# Patient Record
Sex: MALE | Race: WHITE | Employment: FULL TIME | URBAN - NONMETROPOLITAN AREA
[De-identification: names, ages, dates, MRNs, and addresses within clinical notes are randomized per-mention and may not be internally consistent; named-entity substitution may affect disease eponyms.]

---

## 2017-05-26 ENCOUNTER — APPOINTMENT (OUTPATIENT)
Dept: GENERAL RADIOLOGY | Age: 52
End: 2017-05-26
Payer: COMMERCIAL

## 2017-05-26 ENCOUNTER — HOSPITAL ENCOUNTER (EMERGENCY)
Age: 52
Discharge: HOME | End: 2017-05-26
Payer: COMMERCIAL

## 2017-05-26 VITALS
BODY MASS INDEX: 28.04 KG/M2 | RESPIRATION RATE: 18 BRPM | OXYGEN SATURATION: 95 % | HEART RATE: 94 BPM | HEIGHT: 68 IN | SYSTOLIC BLOOD PRESSURE: 130 MMHG | TEMPERATURE: 97.9 F | DIASTOLIC BLOOD PRESSURE: 81 MMHG | WEIGHT: 185 LBS

## 2017-05-26 DIAGNOSIS — S62.639B OPEN FRACTURE OF TUFT OF DISTAL PHALANX OF FINGER, INITIAL ENCOUNTER: Primary | ICD-10-CM

## 2017-05-26 DIAGNOSIS — S61.309A NAIL AVULSION, FINGER, INITIAL ENCOUNTER: ICD-10-CM

## 2017-05-26 PROCEDURE — 90715 TDAP VACCINE 7 YRS/> IM: CPT | Performed by: NURSE PRACTITIONER

## 2017-05-26 PROCEDURE — 99283 EMERGENCY DEPT VISIT LOW MDM: CPT | Performed by: NURSE PRACTITIONER

## 2017-05-26 PROCEDURE — 99283 EMERGENCY DEPT VISIT LOW MDM: CPT

## 2017-05-26 PROCEDURE — 6360000002 HC RX W HCPCS: Performed by: NURSE PRACTITIONER

## 2017-05-26 PROCEDURE — 90471 IMMUNIZATION ADMIN: CPT | Performed by: NURSE PRACTITIONER

## 2017-05-26 PROCEDURE — 12001 RPR S/N/AX/GEN/TRNK 2.5CM/<: CPT | Performed by: NURSE PRACTITIONER

## 2017-05-26 PROCEDURE — 12001 RPR S/N/AX/GEN/TRNK 2.5CM/<: CPT

## 2017-05-26 PROCEDURE — 73140 X-RAY EXAM OF FINGER(S): CPT

## 2017-05-26 PROCEDURE — 2500000003 HC RX 250 WO HCPCS: Performed by: NURSE PRACTITIONER

## 2017-05-26 RX ORDER — LIDOCAINE HYDROCHLORIDE 10 MG/ML
5 INJECTION, SOLUTION INFILTRATION; PERINEURAL ONCE
Status: COMPLETED | OUTPATIENT
Start: 2017-05-26 | End: 2017-05-26

## 2017-05-26 RX ORDER — CEPHALEXIN 500 MG/1
500 CAPSULE ORAL 4 TIMES DAILY
Qty: 28 CAPSULE | Refills: 0 | Status: SHIPPED | OUTPATIENT
Start: 2017-05-26 | End: 2017-06-02

## 2017-05-26 RX ORDER — HYDROCODONE BITARTRATE AND ACETAMINOPHEN 5; 325 MG/1; MG/1
1 TABLET ORAL EVERY 6 HOURS PRN
Qty: 15 TABLET | Refills: 0 | Status: SHIPPED | OUTPATIENT
Start: 2017-05-26 | End: 2017-06-02

## 2017-05-26 RX ADMIN — TETANUS TOXOID, REDUCED DIPHTHERIA TOXOID AND ACELLULAR PERTUSSIS VACCINE, ADSORBED 0.5 ML: 5; 2.5; 8; 8; 2.5 SUSPENSION INTRAMUSCULAR at 18:59

## 2017-05-26 RX ADMIN — LIDOCAINE HYDROCHLORIDE 5 ML: 10 INJECTION, SOLUTION INFILTRATION; PERINEURAL at 19:00

## 2017-05-26 ASSESSMENT — ENCOUNTER SYMPTOMS
EYES NEGATIVE: 1
GASTROINTESTINAL NEGATIVE: 1
RESPIRATORY NEGATIVE: 1

## 2017-05-26 ASSESSMENT — PAIN SCALES - GENERAL
PAINLEVEL_OUTOF10: 5

## 2017-09-08 ENCOUNTER — OFFICE VISIT (OUTPATIENT)
Dept: CARDIOTHORACIC SURGERY | Age: 52
End: 2017-09-08
Payer: COMMERCIAL

## 2017-09-08 VITALS
OXYGEN SATURATION: 97 % | HEART RATE: 71 BPM | HEIGHT: 70 IN | DIASTOLIC BLOOD PRESSURE: 87 MMHG | WEIGHT: 185 LBS | SYSTOLIC BLOOD PRESSURE: 124 MMHG | BODY MASS INDEX: 26.48 KG/M2

## 2017-09-08 DIAGNOSIS — R91.1 PULMONARY NODULE: Primary | ICD-10-CM

## 2017-09-08 PROCEDURE — 99204 OFFICE O/P NEW MOD 45 MIN: CPT | Performed by: THORACIC SURGERY (CARDIOTHORACIC VASCULAR SURGERY)

## 2017-10-24 ENCOUNTER — TELEPHONE (OUTPATIENT)
Dept: CARDIOTHORACIC SURGERY | Age: 52
End: 2017-10-24

## 2017-10-24 NOTE — TELEPHONE ENCOUNTER
Called Mr. Krista Hitchcock and left msg. That Dr Azra Josue was incistant that he stop smoking 3 weeks for proceeding with his PFT to determine if he was a surgical candidate. Told patient to call back if he has any questions.

## 2017-11-10 ENCOUNTER — TELEPHONE (OUTPATIENT)
Dept: CARDIOTHORACIC SURGERY | Age: 52
End: 2017-11-10

## 2017-11-10 DIAGNOSIS — Z85.038 PERSONAL HISTORY OF COLON CANCER: ICD-10-CM

## 2017-11-10 DIAGNOSIS — R91.1 PULMONARY NODULE: Primary | ICD-10-CM

## 2017-11-10 DIAGNOSIS — Z87.891 FORMER CIGARETTE SMOKER: ICD-10-CM

## 2017-11-10 NOTE — TELEPHONE ENCOUNTER
Patient came by office today and stated he had quit smoking for 11/2 wks. I called him back to let him know we have scheduled PFT/ABG for Friday, Nov 17. [place] at 10:30am. He cannot use any inhalers 4 hrs prior. His return appt to see us is scheduled for Tues, Nov 21 at 12:30pm. He voiced understanding.

## 2017-11-17 ENCOUNTER — HOSPITAL ENCOUNTER (OUTPATIENT)
Dept: PULMONOLOGY | Age: 52
Discharge: HOME | End: 2017-11-17
Payer: COMMERCIAL

## 2017-11-17 DIAGNOSIS — R91.1 PULMONARY NODULE: ICD-10-CM

## 2017-11-17 DIAGNOSIS — Z87.891 FORMER CIGARETTE SMOKER: ICD-10-CM

## 2017-11-17 LAB
BASE EXCESS ARTERIAL: 1.3 MMOL/L (ref -2–2)
CARBOXYHEMOGLOBIN ARTERIAL: 0.9 % (ref 0–5)
HCO3 ARTERIAL: 25.9 MMOL/L (ref 22–26)
HEMOGLOBIN, ART, EXTENDED: 15.8 G/DL (ref 14–18)
METHEMOGLOBIN ARTERIAL: 0.4 %
O2 CONTENT ARTERIAL: 20.9 ML/DL
O2 SAT, ARTERIAL: 94.1 %
O2 THERAPY: ABNORMAL
PCO2 ARTERIAL: 40 MMHG (ref 35–45)
PH ARTERIAL: 7.42 (ref 7.35–7.45)
PO2 ARTERIAL: 72 MMHG (ref 80–100)
POTASSIUM, WHOLE BLOOD: 4.1

## 2017-11-17 PROCEDURE — 94729 DIFFUSING CAPACITY: CPT

## 2017-11-17 PROCEDURE — 84132 ASSAY OF SERUM POTASSIUM: CPT

## 2017-11-17 PROCEDURE — 6360000002 HC RX W HCPCS: Performed by: THORACIC SURGERY (CARDIOTHORACIC VASCULAR SURGERY)

## 2017-11-17 PROCEDURE — 94060 EVALUATION OF WHEEZING: CPT

## 2017-11-17 PROCEDURE — 36600 WITHDRAWAL OF ARTERIAL BLOOD: CPT

## 2017-11-17 PROCEDURE — 94727 GAS DIL/WSHOT DETER LNG VOL: CPT

## 2017-11-17 PROCEDURE — 82803 BLOOD GASES ANY COMBINATION: CPT

## 2017-11-17 RX ORDER — ALBUTEROL SULFATE 2.5 MG/3ML
2.5 SOLUTION RESPIRATORY (INHALATION) EVERY 6 HOURS PRN
Status: DISCONTINUED | OUTPATIENT
Start: 2017-11-17 | End: 2017-11-19 | Stop reason: HOSPADM

## 2017-11-21 ENCOUNTER — OFFICE VISIT (OUTPATIENT)
Dept: CARDIOTHORACIC SURGERY | Age: 52
End: 2017-11-21
Payer: COMMERCIAL

## 2017-11-21 VITALS
DIASTOLIC BLOOD PRESSURE: 88 MMHG | HEIGHT: 70 IN | WEIGHT: 189 LBS | BODY MASS INDEX: 27.06 KG/M2 | OXYGEN SATURATION: 96 % | SYSTOLIC BLOOD PRESSURE: 132 MMHG | HEART RATE: 84 BPM

## 2017-11-21 DIAGNOSIS — R91.1 PULMONARY NODULE: Primary | ICD-10-CM

## 2017-11-21 PROCEDURE — 99213 OFFICE O/P EST LOW 20 MIN: CPT | Performed by: THORACIC SURGERY (CARDIOTHORACIC VASCULAR SURGERY)

## 2017-11-21 RX ORDER — SODIUM PICOSULFATE, MAGNESIUM OXIDE, AND ANHYDROUS CITRIC ACID 10; 3.5; 12 MG/16.1G; G/16.1G; G/16.1G
POWDER, METERED ORAL
COMMUNITY
Start: 2017-11-03 | End: 2017-12-05 | Stop reason: ALTCHOICE

## 2017-11-21 RX ORDER — PREDNISONE 10 MG/1
TABLET ORAL
Refills: 0 | COMMUNITY
Start: 2017-10-03 | End: 2017-12-05 | Stop reason: ALTCHOICE

## 2017-11-21 NOTE — PROGRESS NOTES
Subjective:      Patient ID: Jessa Wilkerson is a 46 y.o. male.     HPI    Review of Systems    Objective:   Physical Exam    Assessment:         Plan:

## 2017-11-28 ENCOUNTER — TELEPHONE (OUTPATIENT)
Dept: CARDIOTHORACIC SURGERY | Age: 52
End: 2017-11-28

## 2017-12-04 ENCOUNTER — PREP FOR PROCEDURE (OUTPATIENT)
Dept: CARDIOTHORACIC SURGERY | Age: 52
End: 2017-12-04

## 2017-12-04 DIAGNOSIS — R91.1 PULMONARY NODULE: Primary | ICD-10-CM

## 2017-12-04 RX ORDER — CHLORHEXIDINE GLUCONATE 4 G/100ML
SOLUTION TOPICAL ONCE
Status: CANCELLED | OUTPATIENT
Start: 2017-12-05

## 2017-12-04 NOTE — H&P
predniSONE (DELTASONE) 10 MG tablet TAKE 1 TABLET BY MOUTH TWICE A DAY FOR 5 DAYS   0    Rosuvastatin Calcium (CRESTOR PO) Take by mouth Indications: but hasnt been taking          No current facility-administered medications for this visit.       No Known Allergies  History reviewed. No pertinent family history. Social History            Social History    Marital status:        Spouse name: N/A    Number of children: N/A    Years of education: N/A          Occupational History    Not on file.             Social History Main Topics    Smoking status: Former Smoker       Packs/day: 2.00       Years: 25.00       Types: Cigarettes    Smokeless tobacco: Never Used    Alcohol use Yes         Comment: on weekends    Drug use: No    Sexual activity: Not on file           Other Topics Concern    Not on file          Social History Narrative    No narrative on file            ROS:   HEENT denies neck pain, sore throat, blurred vision, diplopia.    Respiratory chronic shortness of breath,  Denies any hemoptysis or wheezes.    Cardiovascular denies angina, palpitations, lower extremity edema.    GI denies abdominal pain, nausea, vomiting, constipation.  denies urinary frequency, or dysuria.    Musculoskeletal No joint pain or swelling. Neurologic denies diplopia, headache, or ataxia.    The other 14 systems have been reviewed and are negative     Physical Exam /88   Pulse 84   Ht 5' 10\" (1.778 m)   Wt 189 lb (85.7 kg)   SpO2 96%   BMI 27.12 kg/m² appears fairly robust in no acute distress  HEENT: Neck is soft. No cervical masses. Throat is clear. There are no oral lesions seen. Tongue protrudes to midline. Respiratory: clear lung sounds, normal, no wheezes or rhonchi. Cardiovascular: No carotid bruits. Cardiac sounds are clear. Regular rate and rhythm, no murmurs or gallops.   Peripheral pulses are normal and the femoral carotid and radial areas  Abdomen: Abdomen is soft, no masses or tenderness. Normal bowel sounds. There were and spleen are not enlarged  Extremities: No swelling or edema. No clubbing or cyanosis. No rashes or lesions are seen  Lymphatic: No lymphadenopathy in the cervical, axillary or periperals. Neurologic exam: No lateralizing neurologic findings, cranial nerves II-XII are normal.  Psychiatric: No anxiety or depression. This 24-year-old male has a history of advanced stage adenocarcinoma of the colon. Only a year out from his  original surgery with a lesion in the right lung that may be a metastatic lesion versus a primary lung cancer. I agree that surgical excision is indicated. I therefore explained the procedure right thoracotomy with wedge resection of the tumor with possible lobectomy. I discussed risks, benefits, alternatives to the operation. The risks include bleeding, stroke, infection, reoperation, blood transfusion, multisystem organ failure, respiratory failure and sudden cardiac death. Scheduled date of surgery is 12/6/17. I spent in excess of 60 minutes reviewing this patient's chest x-rays, CT scans .   Physical examination and face-to-face discussion       Significant History/Details    Smoking: Former Smoker (Quit Date: ), 2 ppd, 50 pack-years   Smokeless Tobacco: Never Used   Alcohol: Yes   No open orders     Specialty Comments EditShow All   No comments regarding your specialty     Family Comments Edit   None     Care Team and Communications    PCPs Type   Eleni Garcia MD General   No other patient care team members      Recipients of Past [address] Encounter - 5/26/2017     Eleni Garcia MD 5/26/2017 Fax         Gerre Counter as Reviewed Allergies (Review Complete on 11/21/2017)   Problem List (Reviewed by You on 11/21/2017)        Allergies        No Known Allergies     Dirk as: Review Complete Review Complete on 11/21/2017     Problem List   Collapse by Default   Collapse by Default           New problems from outside sources

## 2017-12-05 ENCOUNTER — HOSPITAL ENCOUNTER (OUTPATIENT)
Dept: GENERAL RADIOLOGY | Age: 52
Discharge: HOME | DRG: 165 | End: 2017-12-05
Payer: COMMERCIAL

## 2017-12-05 ENCOUNTER — HOSPITAL ENCOUNTER (OUTPATIENT)
Dept: PREADMISSION TESTING | Age: 52
Discharge: HOME | DRG: 165 | End: 2017-12-05
Payer: COMMERCIAL

## 2017-12-05 VITALS — WEIGHT: 189 LBS | HEIGHT: 70 IN | BODY MASS INDEX: 27.06 KG/M2

## 2017-12-05 DIAGNOSIS — R91.1 PULMONARY NODULE: ICD-10-CM

## 2017-12-05 LAB
ABO/RH: NORMAL
ALBUMIN SERPL-MCNC: 4.1 G/DL (ref 3.5–5.2)
ALP BLD-CCNC: 75 U/L (ref 40–130)
ALT SERPL-CCNC: 17 U/L (ref 5–41)
ANION GAP SERPL CALCULATED.3IONS-SCNC: 12 MMOL/L (ref 7–19)
ANTIBODY SCREEN: NORMAL
APTT: 29.4 SEC (ref 26–36.2)
AST SERPL-CCNC: 13 U/L (ref 5–40)
BASOPHILS ABSOLUTE: 0.1 K/UL (ref 0–0.2)
BASOPHILS RELATIVE PERCENT: 1.1 % (ref 0–1)
BILIRUB SERPL-MCNC: 0.3 MG/DL (ref 0.2–1.2)
BILIRUBIN URINE: NEGATIVE
BLOOD, URINE: NEGATIVE
BUN BLDV-MCNC: 10 MG/DL (ref 6–20)
CALCIUM SERPL-MCNC: 9.1 MG/DL (ref 8.6–10)
CHLORIDE BLD-SCNC: 102 MMOL/L (ref 98–111)
CLARITY: CLEAR
CO2: 26 MMOL/L (ref 22–29)
COLOR: YELLOW
CREAT SERPL-MCNC: 0.8 MG/DL (ref 0.5–1.2)
EOSINOPHILS ABSOLUTE: 0.1 K/UL (ref 0–0.6)
EOSINOPHILS RELATIVE PERCENT: 1.8 % (ref 0–5)
GFR NON-AFRICAN AMERICAN: >60
GLUCOSE BLD-MCNC: 95 MG/DL (ref 74–109)
GLUCOSE URINE: NEGATIVE MG/DL
HCT VFR BLD CALC: 45.5 % (ref 42–52)
HEMOGLOBIN: 15 G/DL (ref 14–18)
INR BLD: 0.92 (ref 0.88–1.18)
KETONES, URINE: NEGATIVE MG/DL
LEUKOCYTE ESTERASE, URINE: NEGATIVE
LYMPHOCYTES ABSOLUTE: 2.1 K/UL (ref 1.1–4.5)
LYMPHOCYTES RELATIVE PERCENT: 26.2 % (ref 20–40)
MCH RBC QN AUTO: 29.2 PG (ref 27–31)
MCHC RBC AUTO-ENTMCNC: 33 G/DL (ref 33–37)
MCV RBC AUTO: 88.7 FL (ref 80–94)
MONOCYTES ABSOLUTE: 0.5 K/UL (ref 0–0.9)
MONOCYTES RELATIVE PERCENT: 6.3 % (ref 0–10)
NEUTROPHILS ABSOLUTE: 5.1 K/UL (ref 1.5–7.5)
NEUTROPHILS RELATIVE PERCENT: 64 % (ref 50–65)
NITRITE, URINE: NEGATIVE
PDW BLD-RTO: 13.5 % (ref 11.5–14.5)
PH UA: 7.5
PLATELET # BLD: 381 K/UL (ref 130–400)
PMV BLD AUTO: 9.4 FL (ref 9.4–12.4)
POTASSIUM SERPL-SCNC: 4.1 MMOL/L (ref 3.5–5)
PROTEIN UA: NEGATIVE MG/DL
PROTHROMBIN TIME: 12.3 SEC (ref 12–14.6)
RBC # BLD: 5.13 M/UL (ref 4.7–6.1)
SODIUM BLD-SCNC: 140 MMOL/L (ref 136–145)
SPECIFIC GRAVITY UA: 1.01
TOTAL PROTEIN: 6.6 G/DL (ref 6.6–8.7)
UROBILINOGEN, URINE: 0.2 E.U./DL
WBC # BLD: 7.9 K/UL (ref 4.8–10.8)

## 2017-12-05 PROCEDURE — 86900 BLOOD TYPING SEROLOGIC ABO: CPT

## 2017-12-05 PROCEDURE — 93005 ELECTROCARDIOGRAM TRACING: CPT

## 2017-12-05 PROCEDURE — 71020 XR CHEST STANDARD TWO VW: CPT

## 2017-12-05 PROCEDURE — 86850 RBC ANTIBODY SCREEN: CPT

## 2017-12-05 PROCEDURE — 80053 COMPREHEN METABOLIC PANEL: CPT

## 2017-12-05 PROCEDURE — 87070 CULTURE OTHR SPECIMN AEROBIC: CPT

## 2017-12-05 PROCEDURE — 85610 PROTHROMBIN TIME: CPT

## 2017-12-05 PROCEDURE — 85730 THROMBOPLASTIN TIME PARTIAL: CPT

## 2017-12-05 PROCEDURE — 85025 COMPLETE CBC W/AUTO DIFF WBC: CPT

## 2017-12-05 PROCEDURE — 81003 URINALYSIS AUTO W/O SCOPE: CPT

## 2017-12-05 PROCEDURE — 86901 BLOOD TYPING SEROLOGIC RH(D): CPT

## 2017-12-05 RX ORDER — ACETAMINOPHEN 325 MG/1
650 TABLET ORAL
COMMUNITY

## 2017-12-06 ENCOUNTER — ANESTHESIA (OUTPATIENT)
Dept: OPERATING ROOM | Age: 52
DRG: 165 | End: 2017-12-06
Payer: COMMERCIAL

## 2017-12-06 ENCOUNTER — HOSPITAL ENCOUNTER (INPATIENT)
Age: 52
LOS: 4 days | Discharge: HOME | DRG: 165 | End: 2017-12-10
Attending: THORACIC SURGERY (CARDIOTHORACIC VASCULAR SURGERY) | Admitting: THORACIC SURGERY (CARDIOTHORACIC VASCULAR SURGERY)
Payer: COMMERCIAL

## 2017-12-06 ENCOUNTER — APPOINTMENT (OUTPATIENT)
Dept: GENERAL RADIOLOGY | Age: 52
DRG: 165 | End: 2017-12-06
Attending: THORACIC SURGERY (CARDIOTHORACIC VASCULAR SURGERY)
Payer: COMMERCIAL

## 2017-12-06 ENCOUNTER — ANESTHESIA EVENT (OUTPATIENT)
Dept: OPERATING ROOM | Age: 52
DRG: 165 | End: 2017-12-06
Payer: COMMERCIAL

## 2017-12-06 VITALS
RESPIRATION RATE: 7 BRPM | TEMPERATURE: 96.3 F | OXYGEN SATURATION: 96 % | SYSTOLIC BLOOD PRESSURE: 112 MMHG | DIASTOLIC BLOOD PRESSURE: 33 MMHG

## 2017-12-06 PROBLEM — C78.00 COLON CANCER METASTASIZED TO LUNG (HCC): Status: ACTIVE | Noted: 2017-12-06

## 2017-12-06 PROBLEM — C18.9 COLON CANCER METASTASIZED TO LUNG (HCC): Status: ACTIVE | Noted: 2017-12-06

## 2017-12-06 LAB
EKG P AXIS: 41 DEGREES
EKG P-R INTERVAL: 144 MS
EKG Q-T INTERVAL: 428 MS
EKG QRS DURATION: 112 MS
EKG QTC CALCULATION (BAZETT): 429 MS
EKG T AXIS: -15 DEGREES
MRSA CULTURE ONLY: NORMAL

## 2017-12-06 PROCEDURE — 2580000003 HC RX 258: Performed by: ANESTHESIOLOGY

## 2017-12-06 PROCEDURE — 2700000000 HC OXYGEN THERAPY PER DAY

## 2017-12-06 PROCEDURE — 3600000014 HC SURGERY LEVEL 4 ADDTL 15MIN: Performed by: THORACIC SURGERY (CARDIOTHORACIC VASCULAR SURGERY)

## 2017-12-06 PROCEDURE — 3700000001 HC ADD 15 MINUTES (ANESTHESIA): Performed by: THORACIC SURGERY (CARDIOTHORACIC VASCULAR SURGERY)

## 2017-12-06 PROCEDURE — 2580000003 HC RX 258: Performed by: THORACIC SURGERY (CARDIOTHORACIC VASCULAR SURGERY)

## 2017-12-06 PROCEDURE — 2500000003 HC RX 250 WO HCPCS: Performed by: NURSE ANESTHETIST, CERTIFIED REGISTERED

## 2017-12-06 PROCEDURE — 6360000002 HC RX W HCPCS: Performed by: NURSE ANESTHETIST, CERTIFIED REGISTERED

## 2017-12-06 PROCEDURE — 2720000003 HC MISC SUTURE/STAPLES/RELOADS/ETC: Performed by: THORACIC SURGERY (CARDIOTHORACIC VASCULAR SURGERY)

## 2017-12-06 PROCEDURE — 6360000002 HC RX W HCPCS: Performed by: THORACIC SURGERY (CARDIOTHORACIC VASCULAR SURGERY)

## 2017-12-06 PROCEDURE — 2580000003 HC RX 258: Performed by: NURSE ANESTHETIST, CERTIFIED REGISTERED

## 2017-12-06 PROCEDURE — 6370000000 HC RX 637 (ALT 250 FOR IP): Performed by: THORACIC SURGERY (CARDIOTHORACIC VASCULAR SURGERY)

## 2017-12-06 PROCEDURE — 3600000004 HC SURGERY LEVEL 4 BASE: Performed by: THORACIC SURGERY (CARDIOTHORACIC VASCULAR SURGERY)

## 2017-12-06 PROCEDURE — 2780000010 HC IMPLANT OTHER: Performed by: THORACIC SURGERY (CARDIOTHORACIC VASCULAR SURGERY)

## 2017-12-06 PROCEDURE — 6360000002 HC RX W HCPCS: Performed by: ANESTHESIOLOGY

## 2017-12-06 PROCEDURE — 94640 AIRWAY INHALATION TREATMENT: CPT

## 2017-12-06 PROCEDURE — 7100000001 HC PACU RECOVERY - ADDTL 15 MIN: Performed by: THORACIC SURGERY (CARDIOTHORACIC VASCULAR SURGERY)

## 2017-12-06 PROCEDURE — 7100000000 HC PACU RECOVERY - FIRST 15 MIN: Performed by: THORACIC SURGERY (CARDIOTHORACIC VASCULAR SURGERY)

## 2017-12-06 PROCEDURE — 0BTF0ZZ RESECTION OF RIGHT LOWER LUNG LOBE, OPEN APPROACH: ICD-10-PCS | Performed by: THORACIC SURGERY (CARDIOTHORACIC VASCULAR SURGERY)

## 2017-12-06 PROCEDURE — 71010 XR CHEST PORTABLE: CPT

## 2017-12-06 PROCEDURE — C1729 CATH, DRAINAGE: HCPCS | Performed by: THORACIC SURGERY (CARDIOTHORACIC VASCULAR SURGERY)

## 2017-12-06 PROCEDURE — 2720000001 HC MISC SURG SUPPLY STERILE $51-500: Performed by: THORACIC SURGERY (CARDIOTHORACIC VASCULAR SURGERY)

## 2017-12-06 PROCEDURE — 3700000000 HC ANESTHESIA ATTENDED CARE: Performed by: THORACIC SURGERY (CARDIOTHORACIC VASCULAR SURGERY)

## 2017-12-06 PROCEDURE — 94664 DEMO&/EVAL PT USE INHALER: CPT

## 2017-12-06 PROCEDURE — 2000000000 HC ICU R&B

## 2017-12-06 PROCEDURE — 6360000002 HC RX W HCPCS

## 2017-12-06 PROCEDURE — 2500000003 HC RX 250 WO HCPCS: Performed by: ANESTHESIOLOGY

## 2017-12-06 PROCEDURE — 32480 PARTIAL REMOVAL OF LUNG: CPT | Performed by: THORACIC SURGERY (CARDIOTHORACIC VASCULAR SURGERY)

## 2017-12-06 PROCEDURE — 2720000010 HC SURG SUPPLY STERILE: Performed by: THORACIC SURGERY (CARDIOTHORACIC VASCULAR SURGERY)

## 2017-12-06 PROCEDURE — 88309 TISSUE EXAM BY PATHOLOGIST: CPT

## 2017-12-06 DEVICE — RELOAD STPL H41XL60MM GRN THCK B FORM NAT ARTC ECHELON: Type: IMPLANTABLE DEVICE | Status: FUNCTIONAL

## 2017-12-06 DEVICE — ENDOPATH ECHELON VASCULAR  RELOADS, WHITE, 35MM
Type: IMPLANTABLE DEVICE | Status: FUNCTIONAL
Brand: ECHELON ENDOPATH

## 2017-12-06 RX ORDER — SODIUM CHLORIDE, SODIUM LACTATE, POTASSIUM CHLORIDE, CALCIUM CHLORIDE 600; 310; 30; 20 MG/100ML; MG/100ML; MG/100ML; MG/100ML
INJECTION, SOLUTION INTRAVENOUS CONTINUOUS PRN
Status: DISCONTINUED | OUTPATIENT
Start: 2017-12-06 | End: 2017-12-06 | Stop reason: SDUPTHER

## 2017-12-06 RX ORDER — LABETALOL HYDROCHLORIDE 5 MG/ML
5 INJECTION, SOLUTION INTRAVENOUS EVERY 10 MIN PRN
Status: DISCONTINUED | OUTPATIENT
Start: 2017-12-06 | End: 2017-12-06 | Stop reason: HOSPADM

## 2017-12-06 RX ORDER — OXYCODONE HYDROCHLORIDE 5 MG/1
10 TABLET ORAL EVERY 4 HOURS PRN
Status: DISCONTINUED | OUTPATIENT
Start: 2017-12-06 | End: 2017-12-10 | Stop reason: HOSPADM

## 2017-12-06 RX ORDER — SODIUM CHLORIDE 450 MG/100ML
INJECTION, SOLUTION INTRAVENOUS CONTINUOUS
Status: DISCONTINUED | OUTPATIENT
Start: 2017-12-06 | End: 2017-12-09

## 2017-12-06 RX ORDER — MORPHINE SULFATE 1 MG/ML
2 INJECTION, SOLUTION EPIDURAL; INTRATHECAL; INTRAVENOUS EVERY 5 MIN PRN
Status: DISCONTINUED | OUTPATIENT
Start: 2017-12-06 | End: 2017-12-06 | Stop reason: HOSPADM

## 2017-12-06 RX ORDER — METOCLOPRAMIDE HYDROCHLORIDE 5 MG/ML
10 INJECTION INTRAMUSCULAR; INTRAVENOUS
Status: DISCONTINUED | OUTPATIENT
Start: 2017-12-06 | End: 2017-12-06 | Stop reason: HOSPADM

## 2017-12-06 RX ORDER — ROCURONIUM BROMIDE 10 MG/ML
INJECTION, SOLUTION INTRAVENOUS PRN
Status: DISCONTINUED | OUTPATIENT
Start: 2017-12-06 | End: 2017-12-06 | Stop reason: SDUPTHER

## 2017-12-06 RX ORDER — DIPHENHYDRAMINE HYDROCHLORIDE 50 MG/ML
12.5 INJECTION INTRAMUSCULAR; INTRAVENOUS
Status: DISCONTINUED | OUTPATIENT
Start: 2017-12-06 | End: 2017-12-06 | Stop reason: HOSPADM

## 2017-12-06 RX ORDER — FENTANYL CITRATE 50 UG/ML
50 INJECTION, SOLUTION INTRAMUSCULAR; INTRAVENOUS
Status: COMPLETED | OUTPATIENT
Start: 2017-12-06 | End: 2017-12-06

## 2017-12-06 RX ORDER — SODIUM CHLORIDE 0.9 % (FLUSH) 0.9 %
10 SYRINGE (ML) INJECTION EVERY 12 HOURS SCHEDULED
Status: DISCONTINUED | OUTPATIENT
Start: 2017-12-06 | End: 2017-12-10 | Stop reason: HOSPADM

## 2017-12-06 RX ORDER — ONDANSETRON 2 MG/ML
4 INJECTION INTRAMUSCULAR; INTRAVENOUS EVERY 6 HOURS PRN
Status: DISCONTINUED | OUTPATIENT
Start: 2017-12-06 | End: 2017-12-09

## 2017-12-06 RX ORDER — GLYCOPYRROLATE 0.2 MG/ML
INJECTION INTRAMUSCULAR; INTRAVENOUS PRN
Status: DISCONTINUED | OUTPATIENT
Start: 2017-12-06 | End: 2017-12-06 | Stop reason: SDUPTHER

## 2017-12-06 RX ORDER — DOCUSATE SODIUM 100 MG/1
100 CAPSULE, LIQUID FILLED ORAL 2 TIMES DAILY
Status: DISCONTINUED | OUTPATIENT
Start: 2017-12-06 | End: 2017-12-10 | Stop reason: HOSPADM

## 2017-12-06 RX ORDER — MORPHINE SULFATE 10 MG/ML
INJECTION, SOLUTION INTRAMUSCULAR; INTRAVENOUS PRN
Status: DISCONTINUED | OUTPATIENT
Start: 2017-12-06 | End: 2017-12-06 | Stop reason: SDUPTHER

## 2017-12-06 RX ORDER — HYDRALAZINE HYDROCHLORIDE 20 MG/ML
5 INJECTION INTRAMUSCULAR; INTRAVENOUS EVERY 10 MIN PRN
Status: DISCONTINUED | OUTPATIENT
Start: 2017-12-06 | End: 2017-12-06 | Stop reason: HOSPADM

## 2017-12-06 RX ORDER — LIDOCAINE HYDROCHLORIDE 10 MG/ML
INJECTION, SOLUTION INFILTRATION; PERINEURAL PRN
Status: DISCONTINUED | OUTPATIENT
Start: 2017-12-06 | End: 2017-12-06 | Stop reason: SDUPTHER

## 2017-12-06 RX ORDER — SODIUM CHLORIDE 0.9 % (FLUSH) 0.9 %
10 SYRINGE (ML) INJECTION EVERY 12 HOURS SCHEDULED
Status: DISCONTINUED | OUTPATIENT
Start: 2017-12-06 | End: 2017-12-06 | Stop reason: HOSPADM

## 2017-12-06 RX ORDER — MIDAZOLAM HYDROCHLORIDE 1 MG/ML
INJECTION INTRAMUSCULAR; INTRAVENOUS
Status: COMPLETED
Start: 2017-12-06 | End: 2017-12-06

## 2017-12-06 RX ORDER — BUPIVACAINE HYDROCHLORIDE 2.5 MG/ML
INJECTION, SOLUTION EPIDURAL; INFILTRATION; INTRACAUDAL PRN
Status: DISCONTINUED | OUTPATIENT
Start: 2017-12-06 | End: 2017-12-06 | Stop reason: SDUPTHER

## 2017-12-06 RX ORDER — PROMETHAZINE HYDROCHLORIDE 25 MG/ML
6.25 INJECTION, SOLUTION INTRAMUSCULAR; INTRAVENOUS
Status: DISCONTINUED | OUTPATIENT
Start: 2017-12-06 | End: 2017-12-06 | Stop reason: HOSPADM

## 2017-12-06 RX ORDER — MORPHINE SULFATE 1 MG/ML
4 INJECTION, SOLUTION EPIDURAL; INTRATHECAL; INTRAVENOUS EVERY 5 MIN PRN
Status: DISCONTINUED | OUTPATIENT
Start: 2017-12-06 | End: 2017-12-06 | Stop reason: HOSPADM

## 2017-12-06 RX ORDER — SCOLOPAMINE TRANSDERMAL SYSTEM 1 MG/1
1 PATCH, EXTENDED RELEASE TRANSDERMAL ONCE
Status: DISCONTINUED | OUTPATIENT
Start: 2017-12-06 | End: 2017-12-06 | Stop reason: HOSPADM

## 2017-12-06 RX ORDER — NALOXONE HYDROCHLORIDE 0.4 MG/ML
0.4 INJECTION, SOLUTION INTRAMUSCULAR; INTRAVENOUS; SUBCUTANEOUS PRN
Status: DISCONTINUED | OUTPATIENT
Start: 2017-12-06 | End: 2017-12-09

## 2017-12-06 RX ORDER — ONDANSETRON 2 MG/ML
4 INJECTION INTRAMUSCULAR; INTRAVENOUS EVERY 6 HOURS PRN
Status: DISCONTINUED | OUTPATIENT
Start: 2017-12-06 | End: 2017-12-10 | Stop reason: HOSPADM

## 2017-12-06 RX ORDER — LIDOCAINE HYDROCHLORIDE 10 MG/ML
1 INJECTION, SOLUTION EPIDURAL; INFILTRATION; INTRACAUDAL; PERINEURAL
Status: DISCONTINUED | OUTPATIENT
Start: 2017-12-06 | End: 2017-12-06 | Stop reason: HOSPADM

## 2017-12-06 RX ORDER — MIDAZOLAM HYDROCHLORIDE 1 MG/ML
2 INJECTION INTRAMUSCULAR; INTRAVENOUS
Status: COMPLETED | OUTPATIENT
Start: 2017-12-06 | End: 2017-12-06

## 2017-12-06 RX ORDER — FAMOTIDINE 20 MG/1
20 TABLET, FILM COATED ORAL 2 TIMES DAILY
Status: DISCONTINUED | OUTPATIENT
Start: 2017-12-06 | End: 2017-12-10 | Stop reason: HOSPADM

## 2017-12-06 RX ORDER — SODIUM CHLORIDE 0.9 % (FLUSH) 0.9 %
10 SYRINGE (ML) INJECTION PRN
Status: DISCONTINUED | OUTPATIENT
Start: 2017-12-06 | End: 2017-12-10 | Stop reason: HOSPADM

## 2017-12-06 RX ORDER — PROPOFOL 10 MG/ML
INJECTION, EMULSION INTRAVENOUS PRN
Status: DISCONTINUED | OUTPATIENT
Start: 2017-12-06 | End: 2017-12-06 | Stop reason: SDUPTHER

## 2017-12-06 RX ORDER — MORPHINE SULFATE 4 MG/ML
4 INJECTION, SOLUTION INTRAMUSCULAR; INTRAVENOUS
Status: DISCONTINUED | OUTPATIENT
Start: 2017-12-06 | End: 2017-12-06 | Stop reason: HOSPADM

## 2017-12-06 RX ORDER — SODIUM CHLORIDE, SODIUM LACTATE, POTASSIUM CHLORIDE, CALCIUM CHLORIDE 600; 310; 30; 20 MG/100ML; MG/100ML; MG/100ML; MG/100ML
INJECTION, SOLUTION INTRAVENOUS CONTINUOUS
Status: DISCONTINUED | OUTPATIENT
Start: 2017-12-06 | End: 2017-12-06

## 2017-12-06 RX ORDER — SODIUM CHLORIDE 0.9 % (FLUSH) 0.9 %
10 SYRINGE (ML) INJECTION PRN
Status: DISCONTINUED | OUTPATIENT
Start: 2017-12-06 | End: 2017-12-06 | Stop reason: HOSPADM

## 2017-12-06 RX ORDER — MEPERIDINE HYDROCHLORIDE 50 MG/ML
12.5 INJECTION INTRAMUSCULAR; INTRAVENOUS; SUBCUTANEOUS EVERY 5 MIN PRN
Status: DISCONTINUED | OUTPATIENT
Start: 2017-12-06 | End: 2017-12-06 | Stop reason: HOSPADM

## 2017-12-06 RX ORDER — IPRATROPIUM BROMIDE AND ALBUTEROL SULFATE 2.5; .5 MG/3ML; MG/3ML
1 SOLUTION RESPIRATORY (INHALATION)
Status: DISCONTINUED | OUTPATIENT
Start: 2017-12-06 | End: 2017-12-10 | Stop reason: HOSPADM

## 2017-12-06 RX ORDER — ENALAPRILAT 2.5 MG/2ML
1.25 INJECTION INTRAVENOUS
Status: DISCONTINUED | OUTPATIENT
Start: 2017-12-06 | End: 2017-12-06 | Stop reason: HOSPADM

## 2017-12-06 RX ORDER — ACETAMINOPHEN 325 MG/1
650 TABLET ORAL EVERY 4 HOURS PRN
Status: DISCONTINUED | OUTPATIENT
Start: 2017-12-06 | End: 2017-12-10 | Stop reason: HOSPADM

## 2017-12-06 RX ORDER — MIDAZOLAM HYDROCHLORIDE 1 MG/ML
INJECTION INTRAMUSCULAR; INTRAVENOUS PRN
Status: DISCONTINUED | OUTPATIENT
Start: 2017-12-06 | End: 2017-12-06 | Stop reason: SDUPTHER

## 2017-12-06 RX ORDER — DIPHENHYDRAMINE HYDROCHLORIDE 50 MG/ML
25 INJECTION INTRAMUSCULAR; INTRAVENOUS EVERY 6 HOURS PRN
Status: DISCONTINUED | OUTPATIENT
Start: 2017-12-06 | End: 2017-12-09

## 2017-12-06 RX ORDER — FENTANYL CITRATE 50 UG/ML
INJECTION, SOLUTION INTRAMUSCULAR; INTRAVENOUS PRN
Status: DISCONTINUED | OUTPATIENT
Start: 2017-12-06 | End: 2017-12-06 | Stop reason: SDUPTHER

## 2017-12-06 RX ORDER — OXYCODONE HYDROCHLORIDE 5 MG/1
5 TABLET ORAL EVERY 4 HOURS PRN
Status: DISCONTINUED | OUTPATIENT
Start: 2017-12-06 | End: 2017-12-10 | Stop reason: HOSPADM

## 2017-12-06 RX ADMIN — MORPHINE SULFATE 5 MG: 10 INJECTION INTRAMUSCULAR; INTRAVENOUS; SUBCUTANEOUS at 11:30

## 2017-12-06 RX ADMIN — MIDAZOLAM 2 MG: 1 INJECTION INTRAMUSCULAR; INTRAVENOUS at 09:23

## 2017-12-06 RX ADMIN — FENTANYL CITRATE 50 MCG: 50 INJECTION, SOLUTION INTRAMUSCULAR; INTRAVENOUS at 09:10

## 2017-12-06 RX ADMIN — DOCUSATE SODIUM 100 MG: 100 CAPSULE, LIQUID FILLED ORAL at 14:48

## 2017-12-06 RX ADMIN — ONDANSETRON 4 MG: 2 INJECTION INTRAMUSCULAR; INTRAVENOUS at 22:42

## 2017-12-06 RX ADMIN — GLYCOPYRROLATE 0.6 MG: 0.2 INJECTION, SOLUTION INTRAMUSCULAR; INTRAVENOUS at 11:59

## 2017-12-06 RX ADMIN — FENTANYL CITRATE 50 MCG: 50 INJECTION, SOLUTION INTRAMUSCULAR; INTRAVENOUS at 09:09

## 2017-12-06 RX ADMIN — FENTANYL CITRATE 100 MCG: 50 INJECTION, SOLUTION INTRAMUSCULAR; INTRAVENOUS at 10:25

## 2017-12-06 RX ADMIN — SODIUM CHLORIDE, SODIUM LACTATE, POTASSIUM CHLORIDE, AND CALCIUM CHLORIDE: 600; 310; 30; 20 INJECTION, SOLUTION INTRAVENOUS at 10:07

## 2017-12-06 RX ADMIN — PROPOFOL 50 MG: 10 INJECTION, EMULSION INTRAVENOUS at 10:46

## 2017-12-06 RX ADMIN — MIDAZOLAM HYDROCHLORIDE 2 MG: 1 INJECTION, SOLUTION INTRAMUSCULAR; INTRAVENOUS at 10:14

## 2017-12-06 RX ADMIN — BUPIVACAINE HYDROCHLORIDE 7 ML: 2.5 INJECTION, SOLUTION EPIDURAL; INFILTRATION; INTRACAUDAL; PERINEURAL at 11:16

## 2017-12-06 RX ADMIN — CEFAZOLIN SODIUM 2 G: 2 SOLUTION INTRAVENOUS at 18:08

## 2017-12-06 RX ADMIN — SODIUM CHLORIDE 75 ML/HR: 4.5 INJECTION, SOLUTION INTRAVENOUS at 14:06

## 2017-12-06 RX ADMIN — CEFAZOLIN SODIUM 2 G: 2 SOLUTION INTRAVENOUS at 10:23

## 2017-12-06 RX ADMIN — HYDROMORPHONE HYDROCHLORIDE 0.5 MG: 1 INJECTION, SOLUTION INTRAMUSCULAR; INTRAVENOUS; SUBCUTANEOUS at 13:21

## 2017-12-06 RX ADMIN — FENTANYL CITRATE 50 MCG: 50 INJECTION, SOLUTION INTRAMUSCULAR; INTRAVENOUS at 10:46

## 2017-12-06 RX ADMIN — OXYCODONE HYDROCHLORIDE 10 MG: 5 TABLET ORAL at 20:57

## 2017-12-06 RX ADMIN — NEOSTIGMINE METHYLSULFATE 4 MG: 1 INJECTION, SOLUTION INTRAMUSCULAR; INTRAVENOUS; SUBCUTANEOUS at 11:59

## 2017-12-06 RX ADMIN — DOCUSATE SODIUM 100 MG: 100 CAPSULE, LIQUID FILLED ORAL at 20:56

## 2017-12-06 RX ADMIN — SODIUM CHLORIDE, SODIUM LACTATE, POTASSIUM CHLORIDE, AND CALCIUM CHLORIDE: 600; 310; 30; 20 INJECTION, SOLUTION INTRAVENOUS at 13:04

## 2017-12-06 RX ADMIN — IPRATROPIUM BROMIDE AND ALBUTEROL SULFATE 1 AMPULE: .5; 3 SOLUTION RESPIRATORY (INHALATION) at 18:52

## 2017-12-06 RX ADMIN — ONDANSETRON 4 MG: 2 INJECTION INTRAMUSCULAR; INTRAVENOUS at 18:15

## 2017-12-06 RX ADMIN — MORPHINE SULFATE 5 MG: 10 INJECTION INTRAMUSCULAR; INTRAVENOUS; SUBCUTANEOUS at 10:57

## 2017-12-06 RX ADMIN — FENTANYL CITRATE 100 MCG: 50 INJECTION, SOLUTION INTRAMUSCULAR; INTRAVENOUS at 11:40

## 2017-12-06 RX ADMIN — FENTANYL CITRATE 100 MCG: 50 INJECTION, SOLUTION INTRAMUSCULAR; INTRAVENOUS at 10:35

## 2017-12-06 RX ADMIN — LIDOCAINE HYDROCHLORIDE 50 MG: 10 INJECTION, SOLUTION INFILTRATION; PERINEURAL at 10:17

## 2017-12-06 RX ADMIN — HYDROMORPHONE HYDROCHLORIDE 0.5 MG: 1 INJECTION, SOLUTION INTRAMUSCULAR; INTRAVENOUS; SUBCUTANEOUS at 13:04

## 2017-12-06 RX ADMIN — SODIUM CHLORIDE, SODIUM LACTATE, POTASSIUM CHLORIDE, AND CALCIUM CHLORIDE: 600; 310; 30; 20 INJECTION, SOLUTION INTRAVENOUS at 08:29

## 2017-12-06 RX ADMIN — BUPIVACAINE HYDROCHLORIDE 3 ML: 2.5 INJECTION, SOLUTION EPIDURAL; INFILTRATION; INTRACAUDAL; PERINEURAL at 11:25

## 2017-12-06 RX ADMIN — FENTANYL CITRATE 100 MCG: 50 INJECTION, SOLUTION INTRAMUSCULAR; INTRAVENOUS at 12:15

## 2017-12-06 RX ADMIN — OXYCODONE HYDROCHLORIDE 10 MG: 5 TABLET ORAL at 14:52

## 2017-12-06 RX ADMIN — IPRATROPIUM BROMIDE AND ALBUTEROL SULFATE 1 AMPULE: .5; 3 SOLUTION RESPIRATORY (INHALATION) at 14:53

## 2017-12-06 RX ADMIN — FAMOTIDINE 20 MG: 20 TABLET ORAL at 14:48

## 2017-12-06 RX ADMIN — Medication 10 ML: at 22:42

## 2017-12-06 RX ADMIN — MIDAZOLAM 2 MG: 1 INJECTION INTRAMUSCULAR; INTRAVENOUS at 09:09

## 2017-12-06 RX ADMIN — HYDROMORPHONE HYDROCHLORIDE: 10 INJECTION, SOLUTION INTRAMUSCULAR; INTRAVENOUS; SUBCUTANEOUS at 12:35

## 2017-12-06 RX ADMIN — FAMOTIDINE 20 MG: 20 TABLET ORAL at 20:56

## 2017-12-06 RX ADMIN — ROCURONIUM BROMIDE 50 MG: 10 INJECTION INTRAVENOUS at 10:17

## 2017-12-06 RX ADMIN — FENTANYL CITRATE 50 MCG: 50 INJECTION, SOLUTION INTRAMUSCULAR; INTRAVENOUS at 12:03

## 2017-12-06 RX ADMIN — PROPOFOL 150 MG: 10 INJECTION, EMULSION INTRAVENOUS at 10:17

## 2017-12-06 ASSESSMENT — PAIN DESCRIPTION - ONSET: ONSET: AWAKENED FROM SLEEP

## 2017-12-06 ASSESSMENT — PAIN SCALES - GENERAL
PAINLEVEL_OUTOF10: 2
PAINLEVEL_OUTOF10: 7
PAINLEVEL_OUTOF10: 5
PAINLEVEL_OUTOF10: 8
PAINLEVEL_OUTOF10: 2
PAINLEVEL_OUTOF10: 2
PAINLEVEL_OUTOF10: 10
PAINLEVEL_OUTOF10: 3
PAINLEVEL_OUTOF10: 5
PAINLEVEL_OUTOF10: 10
PAINLEVEL_OUTOF10: 2
PAINLEVEL_OUTOF10: 8

## 2017-12-06 ASSESSMENT — PAIN DESCRIPTION - PAIN TYPE
TYPE: SURGICAL PAIN
TYPE: SURGICAL PAIN

## 2017-12-06 ASSESSMENT — PAIN DESCRIPTION - ORIENTATION
ORIENTATION: RIGHT
ORIENTATION: RIGHT

## 2017-12-06 ASSESSMENT — PAIN DESCRIPTION - LOCATION
LOCATION: CHEST
LOCATION: CHEST;RIB CAGE

## 2017-12-06 ASSESSMENT — LIFESTYLE VARIABLES: SMOKING_STATUS: 0

## 2017-12-06 ASSESSMENT — PAIN DESCRIPTION - DESCRIPTORS: DESCRIPTORS: BURNING;SHARP

## 2017-12-06 ASSESSMENT — PAIN DESCRIPTION - FREQUENCY: FREQUENCY: CONTINUOUS

## 2017-12-06 NOTE — ANESTHESIA POSTPROCEDURE EVALUATION
Department of Anesthesiology  Postprocedure Note    Patient: Jamal Griggs  MRN: [id number]  YOB: 1965  Date of evaluation: 12/6/2017  Time:  12:30 PM     Procedure Summary     Date:  12/06/17 Room / Location:  [facility] OR  / [facility] OR    Anesthesia Start:  1007 Anesthesia Stop:  4354    Procedure:  RIGHT THORACOTOMY; RIGHT LOWER  LOBECTOMY (Right ) Diagnosis:  (Pulmonary nodule)    Surgeon:  Aleksandra Stout MD Responsible Provider:  Becca Rivera CRNA    Anesthesia Type:  general, epidural ASA Status:  3          Anesthesia Type: general, epidural    Tabitha Phase I: Tabitha Score: 10    Tabitha Phase II:      Last vitals: Reviewed and per EMR flowsheets.        Anesthesia Post Evaluation    Patient location during evaluation: PACU  Patient participation: complete - patient participated  Level of consciousness: awake and alert  Pain score: 4  Airway patency: patent  Nausea & Vomiting: no nausea and no vomiting  Complications: no  Cardiovascular status: blood pressure returned to baseline  Respiratory status: acceptable and spontaneous ventilation  Hydration status: stable

## 2017-12-06 NOTE — ANESTHESIA PROCEDURE NOTES
Arterial Line:    An arterial line was placed using ultrasound guidance, in the holding area for the following indication(s): continuous blood pressure monitoring. A 20 gauge (size), 1 and 3/4 inch (length), Arrow (type) catheter was placed, Seldinger technique used, into the right radial artery, secured by Tegaderm. Anesthesia type: Local  Local infiltration: Injection    Events:  hematoma during insertion, greater than 3 attempts and patient tolerated procedure well with no complications. Additional notes:  Radial artery stuck 2 times on left, unable to thread wire. Large hematoma, thus moved to right side.   Radial artery cannulated successfully on second attempt right side  Anesthesiologist: Regina First  Performed: Anesthesiologist   Preanesthetic Checklist  Completed: patient identified, site marked, surgical consent, pre-op evaluation, timeout performed, IV checked, risks and benefits discussed, monitors and equipment checked, anesthesia consent given, oxygen available and patient being monitored

## 2017-12-06 NOTE — ANESTHESIA PRE PROCEDURE
Department of Anesthesiology  Preprocedure Note       Name:  Anisa Galvan   Age:  46 y.o.  :  1965                                          MRN:  [id number]         Date:  2017      Surgeon: Fernande Boas):  Preston Flowers MD    Procedure: Procedure(s):  RIGHT THORACOTOMY POSSIBLE LOBECTOMY    Medications prior to admission:   Prior to Admission medications    Medication Sig Start Date End Date Taking?  Authorizing Provider   acetaminophen (TYLENOL) 325 MG tablet Take 650 mg by mouth    Historical Provider, MD   Rosuvastatin Calcium (CRESTOR PO) Take by mouth Indications: but hasnt been taking    Historical Provider, MD       Current medications:    Current Facility-Administered Medications   Medication Dose Route Frequency Provider Last Rate Last Dose    lactated ringers infusion   Intravenous Continuous Suzanne Truong  mL/hr at 17 3744         Allergies:  No Known Allergies    Problem List:    Patient Active Problem List   Diagnosis Code    Pulmonary nodule R91.1    Personal history of colon cancer Z85.038       Past Medical History:        Diagnosis Date    [facility]     [facility]    [facility]     Hyperlipidemia     Upper respiratory infection     recent tx with antibiotics and prednisone       Past Surgical History:        Procedure Laterality Date    [facility]      [facility]       Social History:    Social History   Substance Use Topics    Smoking status: Former Smoker     Packs/day: 2.00     Years: 25.00     Types: Cigarettes    Smokeless tobacco: Never Used      Comment: quit recently    Alcohol use 7.2 oz/week     12 Cans of beer per week      Comment: avg per week/mostly fri and sat nights                                Counseling given: Not Answered      Vital Signs (Current):   Vitals:    17 0821   BP: (!) 148/89   Pulse: 70   Resp: 16   Temp: 98.2 °F (36.8 °C)   TempSrc: Temporal   SpO2: 99% (+) upper dentures      Pulmonary:   (+) COPD:      (-) not a current smoker                          ROS comment: Quit tob 3 weeks ago   Cardiovascular:    (+) hyperlipidemia    (-) hypertension       Beta Blocker:  Not on Beta Blocker         Neuro/Psych:   Negative Neuro/Psych ROS              GI/Hepatic/Renal:            ROS comment: H/o colon ca. Endo/Other:        (-) no Type II DM               Abdominal:           Vascular: negative vascular ROS. Anesthesia Plan      general and epidural     ASA 3       Induction: intravenous. arterial line  MIPS: Postoperative opioids intended and Prophylactic antiemetics administered. Anesthetic plan and risks discussed with patient.                       Lary Camarillo MD   12/6/2017

## 2017-12-07 ENCOUNTER — APPOINTMENT (OUTPATIENT)
Dept: GENERAL RADIOLOGY | Age: 52
DRG: 165 | End: 2017-12-07
Attending: THORACIC SURGERY (CARDIOTHORACIC VASCULAR SURGERY)
Payer: COMMERCIAL

## 2017-12-07 LAB
ANION GAP SERPL CALCULATED.3IONS-SCNC: 14 MMOL/L (ref 7–19)
BUN BLDV-MCNC: 10 MG/DL (ref 6–20)
CALCIUM SERPL-MCNC: 8.5 MG/DL (ref 8.6–10)
CHLORIDE BLD-SCNC: 102 MMOL/L (ref 98–111)
CO2: 26 MMOL/L (ref 22–29)
CREAT SERPL-MCNC: 0.7 MG/DL (ref 0.5–1.2)
GFR NON-AFRICAN AMERICAN: >60
GLUCOSE BLD-MCNC: 128 MG/DL (ref 74–109)
GLUCOSE BLD-MCNC: 178 MG/DL (ref 70–99)
HCT VFR BLD CALC: 40.3 % (ref 42–52)
HEMOGLOBIN: 13.5 G/DL (ref 14–18)
MCH RBC QN AUTO: 29.7 PG (ref 27–31)
MCHC RBC AUTO-ENTMCNC: 33.5 G/DL (ref 33–37)
MCV RBC AUTO: 88.8 FL (ref 80–94)
PDW BLD-RTO: 13.7 % (ref 11.5–14.5)
PERFORMED ON: ABNORMAL
PLATELET # BLD: 336 K/UL (ref 130–400)
PMV BLD AUTO: 9.5 FL (ref 9.4–12.4)
POTASSIUM SERPL-SCNC: 4 MMOL/L (ref 3.5–5)
RBC # BLD: 4.54 M/UL (ref 4.7–6.1)
SODIUM BLD-SCNC: 142 MMOL/L (ref 136–145)
WBC # BLD: 15 K/UL (ref 4.8–10.8)

## 2017-12-07 PROCEDURE — 6360000002 HC RX W HCPCS: Performed by: THORACIC SURGERY (CARDIOTHORACIC VASCULAR SURGERY)

## 2017-12-07 PROCEDURE — 82948 REAGENT STRIP/BLOOD GLUCOSE: CPT

## 2017-12-07 PROCEDURE — 6360000002 HC RX W HCPCS: Performed by: ANESTHESIOLOGY

## 2017-12-07 PROCEDURE — 6370000000 HC RX 637 (ALT 250 FOR IP): Performed by: THORACIC SURGERY (CARDIOTHORACIC VASCULAR SURGERY)

## 2017-12-07 PROCEDURE — 2500000003 HC RX 250 WO HCPCS: Performed by: ANESTHESIOLOGY

## 2017-12-07 PROCEDURE — 2580000003 HC RX 258: Performed by: ANESTHESIOLOGY

## 2017-12-07 PROCEDURE — 85027 COMPLETE CBC AUTOMATED: CPT

## 2017-12-07 PROCEDURE — 2580000003 HC RX 258: Performed by: THORACIC SURGERY (CARDIOTHORACIC VASCULAR SURGERY)

## 2017-12-07 PROCEDURE — 71010 XR CHEST PORTABLE: CPT

## 2017-12-07 PROCEDURE — 80048 BASIC METABOLIC PNL TOTAL CA: CPT

## 2017-12-07 PROCEDURE — 94640 AIRWAY INHALATION TREATMENT: CPT

## 2017-12-07 PROCEDURE — 99024 POSTOP FOLLOW-UP VISIT: CPT | Performed by: THORACIC SURGERY (CARDIOTHORACIC VASCULAR SURGERY)

## 2017-12-07 PROCEDURE — 2140000000 HC CCU INTERMEDIATE R&B

## 2017-12-07 PROCEDURE — 2700000000 HC OXYGEN THERAPY PER DAY

## 2017-12-07 RX ADMIN — ONDANSETRON 4 MG: 2 INJECTION INTRAMUSCULAR; INTRAVENOUS at 05:38

## 2017-12-07 RX ADMIN — IPRATROPIUM BROMIDE AND ALBUTEROL SULFATE 1 AMPULE: .5; 3 SOLUTION RESPIRATORY (INHALATION) at 14:48

## 2017-12-07 RX ADMIN — SODIUM CHLORIDE: 4.5 INJECTION, SOLUTION INTRAVENOUS at 02:09

## 2017-12-07 RX ADMIN — Medication 10 ML: at 09:48

## 2017-12-07 RX ADMIN — DOCUSATE SODIUM 100 MG: 100 CAPSULE, LIQUID FILLED ORAL at 09:48

## 2017-12-07 RX ADMIN — HYDROMORPHONE HYDROCHLORIDE: 10 INJECTION, SOLUTION INTRAMUSCULAR; INTRAVENOUS; SUBCUTANEOUS at 00:51

## 2017-12-07 RX ADMIN — ONDANSETRON 4 MG: 2 INJECTION INTRAMUSCULAR; INTRAVENOUS at 17:49

## 2017-12-07 RX ADMIN — CEFAZOLIN SODIUM 2 G: 2 SOLUTION INTRAVENOUS at 02:09

## 2017-12-07 RX ADMIN — IPRATROPIUM BROMIDE AND ALBUTEROL SULFATE 1 AMPULE: .5; 3 SOLUTION RESPIRATORY (INHALATION) at 19:24

## 2017-12-07 RX ADMIN — FAMOTIDINE 20 MG: 20 TABLET ORAL at 21:34

## 2017-12-07 RX ADMIN — OXYCODONE HYDROCHLORIDE 10 MG: 5 TABLET ORAL at 12:54

## 2017-12-07 RX ADMIN — ACETAMINOPHEN 650 MG: 325 TABLET ORAL at 17:16

## 2017-12-07 RX ADMIN — DOCUSATE SODIUM 100 MG: 100 CAPSULE, LIQUID FILLED ORAL at 21:34

## 2017-12-07 RX ADMIN — FAMOTIDINE 20 MG: 20 TABLET ORAL at 09:48

## 2017-12-07 RX ADMIN — IPRATROPIUM BROMIDE AND ALBUTEROL SULFATE 1 AMPULE: .5; 3 SOLUTION RESPIRATORY (INHALATION) at 06:35

## 2017-12-07 RX ADMIN — OXYCODONE HYDROCHLORIDE 10 MG: 5 TABLET ORAL at 17:14

## 2017-12-07 RX ADMIN — Medication 10 ML: at 05:38

## 2017-12-07 RX ADMIN — OXYCODONE HYDROCHLORIDE 10 MG: 5 TABLET ORAL at 02:07

## 2017-12-07 RX ADMIN — OXYCODONE HYDROCHLORIDE 10 MG: 5 TABLET ORAL at 08:44

## 2017-12-07 RX ADMIN — HYDROMORPHONE HYDROCHLORIDE: 10 INJECTION, SOLUTION INTRAMUSCULAR; INTRAVENOUS; SUBCUTANEOUS at 15:09

## 2017-12-07 RX ADMIN — IPRATROPIUM BROMIDE AND ALBUTEROL SULFATE 1 AMPULE: .5; 3 SOLUTION RESPIRATORY (INHALATION) at 10:21

## 2017-12-07 ASSESSMENT — PAIN SCALES - GENERAL
PAINLEVEL_OUTOF10: 0
PAINLEVEL_OUTOF10: 2
PAINLEVEL_OUTOF10: 5
PAINLEVEL_OUTOF10: 3
PAINLEVEL_OUTOF10: 5
PAINLEVEL_OUTOF10: 5
PAINLEVEL_OUTOF10: 7
PAINLEVEL_OUTOF10: 0
PAINLEVEL_OUTOF10: 2
PAINLEVEL_OUTOF10: 7
PAINLEVEL_OUTOF10: 8

## 2017-12-07 ASSESSMENT — PAIN SCALES - WONG BAKER
WONGBAKER_NUMERICALRESPONSE: 2
WONGBAKER_NUMERICALRESPONSE: 2
WONGBAKER_NUMERICALRESPONSE: 0
WONGBAKER_NUMERICALRESPONSE: 2
WONGBAKER_NUMERICALRESPONSE: 0

## 2017-12-07 NOTE — PROGRESS NOTES
Report to Lisset RN, PCU. Pt stable and ready for transport to floor. Galaviz cath removed. Pt tolerated without c/o or distress. VSS. CT x 2 to -20 dry suction on Right side.

## 2017-12-07 NOTE — PROGRESS NOTES
POST OP CARDIOTHORACIC SURGERY PROGRESS NOTE    Post op day 1    SUBJECTIVE:  Alert, Comfortable, , Up in chair. /87   Pulse 77   Temp 98.1 °F (36.7 °C) (Temporal)   Resp 15   Ht 5' 10\" (1.778 m)   Wt 195 lb (88.5 kg)   SpO2 94%   BMI 27.98 kg/m²   Average, Min, and Max for last 24 hours Vitals:  TEMPERATURE:  Temp  Av.9 °F (35.5 °C)  Min: 95.1 °F (35.1 °C)  Max: 99.4 °F (37.4 °C)  RESPIRATIONS RANGE: Resp  Av.8  Min: 1  Max: 35  PULSE RANGE: Pulse  Av.8  Min: 65  Max: 101  BLOOD PRESSURE RANGE:  Systolic (58PXM), RAJINDER:922 , Min:101 , QPE:377   ; Diastolic (81ONP), NZF:37, Min:33, Max:98    PULSE OXIMETRY RANGE: SpO2  Av.2 %  Min: 90 %  Max: 100 %    I/O last 3 completed shifts: In: 892.8 [P.O.:175; I.V.:667.8;  IV Piggyback:50]  Out: 2104 [Urine:1165; Blood:500; Chest Tube:439]    CHEST: lungs clear    CARDIOVASCULAR: regular rhythm, no murmurs    INCISION: no drainage, skin edges intact    DRAINS: output - 579 ml/24 hours; no air leak    LABS:  CBC:   Lab Results   Component Value Date    WBC 15.0 2017    RBC 4.54 2017    HGB 13.5 2017    HCT 40.3 2017    MCV 88.8 2017    MCH 29.7 2017    MCHC 33.5 2017    RDW 13.7 2017     2017    MPV 9.5 2017     BMP:    Lab Results   Component Value Date     2017    K 4.0 2017     2017    CO2 26 2017    BUN 10 2017    LABALBU 4.1 2017    CREATININE 0.7 2017    CALCIUM 8.5 2017    LABGLOM >60 2017    GLUCOSE 128 2017       CHEST XRAY: normal postoperative chest xray    ASSESSMENT: normal postoperative course    PLAN: OK to go to PCU    Electronically signed by Rina Lyons MD on 17 at 6:52 AM

## 2017-12-08 ENCOUNTER — APPOINTMENT (OUTPATIENT)
Dept: GENERAL RADIOLOGY | Age: 52
DRG: 165 | End: 2017-12-08
Attending: THORACIC SURGERY (CARDIOTHORACIC VASCULAR SURGERY)
Payer: COMMERCIAL

## 2017-12-08 PROCEDURE — 6360000002 HC RX W HCPCS: Performed by: ANESTHESIOLOGY

## 2017-12-08 PROCEDURE — 6370000000 HC RX 637 (ALT 250 FOR IP): Performed by: THORACIC SURGERY (CARDIOTHORACIC VASCULAR SURGERY)

## 2017-12-08 PROCEDURE — 2700000000 HC OXYGEN THERAPY PER DAY

## 2017-12-08 PROCEDURE — 94640 AIRWAY INHALATION TREATMENT: CPT

## 2017-12-08 PROCEDURE — G8979 MOBILITY GOAL STATUS: HCPCS

## 2017-12-08 PROCEDURE — 97162 PT EVAL MOD COMPLEX 30 MIN: CPT

## 2017-12-08 PROCEDURE — 71010 XR CHEST PORTABLE: CPT

## 2017-12-08 PROCEDURE — G8978 MOBILITY CURRENT STATUS: HCPCS

## 2017-12-08 PROCEDURE — 99024 POSTOP FOLLOW-UP VISIT: CPT | Performed by: THORACIC SURGERY (CARDIOTHORACIC VASCULAR SURGERY)

## 2017-12-08 PROCEDURE — 2580000003 HC RX 258: Performed by: THORACIC SURGERY (CARDIOTHORACIC VASCULAR SURGERY)

## 2017-12-08 PROCEDURE — 2580000003 HC RX 258: Performed by: ANESTHESIOLOGY

## 2017-12-08 PROCEDURE — 2140000000 HC CCU INTERMEDIATE R&B

## 2017-12-08 PROCEDURE — 2500000003 HC RX 250 WO HCPCS: Performed by: ANESTHESIOLOGY

## 2017-12-08 RX ADMIN — SODIUM CHLORIDE: 4.5 INJECTION, SOLUTION INTRAVENOUS at 16:05

## 2017-12-08 RX ADMIN — FAMOTIDINE 20 MG: 20 TABLET ORAL at 22:56

## 2017-12-08 RX ADMIN — DOCUSATE SODIUM 100 MG: 100 CAPSULE, LIQUID FILLED ORAL at 08:29

## 2017-12-08 RX ADMIN — IPRATROPIUM BROMIDE AND ALBUTEROL SULFATE 1 AMPULE: .5; 3 SOLUTION RESPIRATORY (INHALATION) at 18:55

## 2017-12-08 RX ADMIN — Medication 10 ML: at 08:31

## 2017-12-08 RX ADMIN — IPRATROPIUM BROMIDE AND ALBUTEROL SULFATE 1 AMPULE: .5; 3 SOLUTION RESPIRATORY (INHALATION) at 11:28

## 2017-12-08 RX ADMIN — DOCUSATE SODIUM 100 MG: 100 CAPSULE, LIQUID FILLED ORAL at 22:56

## 2017-12-08 RX ADMIN — HYDROMORPHONE HYDROCHLORIDE: 10 INJECTION, SOLUTION INTRAMUSCULAR; INTRAVENOUS; SUBCUTANEOUS at 17:06

## 2017-12-08 RX ADMIN — IPRATROPIUM BROMIDE AND ALBUTEROL SULFATE 1 AMPULE: .5; 3 SOLUTION RESPIRATORY (INHALATION) at 15:18

## 2017-12-08 RX ADMIN — HYDROMORPHONE HYDROCHLORIDE: 10 INJECTION, SOLUTION INTRAMUSCULAR; INTRAVENOUS; SUBCUTANEOUS at 03:06

## 2017-12-08 RX ADMIN — IPRATROPIUM BROMIDE AND ALBUTEROL SULFATE 1 AMPULE: .5; 3 SOLUTION RESPIRATORY (INHALATION) at 07:24

## 2017-12-08 RX ADMIN — OXYCODONE HYDROCHLORIDE 10 MG: 5 TABLET ORAL at 15:28

## 2017-12-08 RX ADMIN — FAMOTIDINE 20 MG: 20 TABLET ORAL at 08:29

## 2017-12-08 RX ADMIN — IPRATROPIUM BROMIDE AND ALBUTEROL SULFATE 1 AMPULE: .5; 3 SOLUTION RESPIRATORY (INHALATION) at 04:19

## 2017-12-08 ASSESSMENT — PAIN DESCRIPTION - LOCATION: LOCATION: BACK

## 2017-12-08 ASSESSMENT — PAIN SCALES - GENERAL
PAINLEVEL_OUTOF10: 2
PAINLEVEL_OUTOF10: 2
PAINLEVEL_OUTOF10: 4
PAINLEVEL_OUTOF10: 6

## 2017-12-08 ASSESSMENT — PAIN DESCRIPTION - FREQUENCY: FREQUENCY: CONTINUOUS

## 2017-12-08 ASSESSMENT — PAIN DESCRIPTION - PAIN TYPE: TYPE: SURGICAL PAIN

## 2017-12-08 ASSESSMENT — PAIN DESCRIPTION - DESCRIPTORS: DESCRIPTORS: SORE

## 2017-12-08 ASSESSMENT — PAIN DESCRIPTION - ORIENTATION: ORIENTATION: RIGHT

## 2017-12-09 ENCOUNTER — APPOINTMENT (OUTPATIENT)
Dept: GENERAL RADIOLOGY | Age: 52
DRG: 165 | End: 2017-12-09
Attending: THORACIC SURGERY (CARDIOTHORACIC VASCULAR SURGERY)
Payer: COMMERCIAL

## 2017-12-09 PROCEDURE — 6370000000 HC RX 637 (ALT 250 FOR IP): Performed by: THORACIC SURGERY (CARDIOTHORACIC VASCULAR SURGERY)

## 2017-12-09 PROCEDURE — 2580000003 HC RX 258: Performed by: THORACIC SURGERY (CARDIOTHORACIC VASCULAR SURGERY)

## 2017-12-09 PROCEDURE — 94640 AIRWAY INHALATION TREATMENT: CPT

## 2017-12-09 PROCEDURE — 6360000002 HC RX W HCPCS: Performed by: ANESTHESIOLOGY

## 2017-12-09 PROCEDURE — 2500000003 HC RX 250 WO HCPCS: Performed by: ANESTHESIOLOGY

## 2017-12-09 PROCEDURE — 97116 GAIT TRAINING THERAPY: CPT

## 2017-12-09 PROCEDURE — 2700000000 HC OXYGEN THERAPY PER DAY

## 2017-12-09 PROCEDURE — 2580000003 HC RX 258: Performed by: ANESTHESIOLOGY

## 2017-12-09 PROCEDURE — 2140000000 HC CCU INTERMEDIATE R&B

## 2017-12-09 PROCEDURE — 71010 XR CHEST PORTABLE: CPT

## 2017-12-09 RX ADMIN — IPRATROPIUM BROMIDE AND ALBUTEROL SULFATE 1 AMPULE: .5; 3 SOLUTION RESPIRATORY (INHALATION) at 15:06

## 2017-12-09 RX ADMIN — DOCUSATE SODIUM 100 MG: 100 CAPSULE, LIQUID FILLED ORAL at 11:21

## 2017-12-09 RX ADMIN — IPRATROPIUM BROMIDE AND ALBUTEROL SULFATE 1 AMPULE: .5; 3 SOLUTION RESPIRATORY (INHALATION) at 19:02

## 2017-12-09 RX ADMIN — FAMOTIDINE 20 MG: 20 TABLET ORAL at 21:03

## 2017-12-09 RX ADMIN — Medication 10 ML: at 21:03

## 2017-12-09 RX ADMIN — FAMOTIDINE 20 MG: 20 TABLET ORAL at 11:20

## 2017-12-09 RX ADMIN — OXYCODONE HYDROCHLORIDE 10 MG: 5 TABLET ORAL at 17:51

## 2017-12-09 RX ADMIN — IPRATROPIUM BROMIDE AND ALBUTEROL SULFATE 1 AMPULE: .5; 3 SOLUTION RESPIRATORY (INHALATION) at 07:40

## 2017-12-09 RX ADMIN — OXYCODONE HYDROCHLORIDE 10 MG: 5 TABLET ORAL at 12:18

## 2017-12-09 RX ADMIN — DOCUSATE SODIUM 100 MG: 100 CAPSULE, LIQUID FILLED ORAL at 21:03

## 2017-12-09 RX ADMIN — OXYCODONE HYDROCHLORIDE 10 MG: 5 TABLET ORAL at 00:04

## 2017-12-09 RX ADMIN — Medication 10 ML: at 11:21

## 2017-12-09 RX ADMIN — IPRATROPIUM BROMIDE AND ALBUTEROL SULFATE 1 AMPULE: .5; 3 SOLUTION RESPIRATORY (INHALATION) at 11:17

## 2017-12-09 RX ADMIN — HYDROMORPHONE HYDROCHLORIDE: 10 INJECTION, SOLUTION INTRAMUSCULAR; INTRAVENOUS; SUBCUTANEOUS at 03:25

## 2017-12-09 ASSESSMENT — PAIN SCALES - GENERAL
PAINLEVEL_OUTOF10: 0
PAINLEVEL_OUTOF10: 8
PAINLEVEL_OUTOF10: 7
PAINLEVEL_OUTOF10: 0
PAINLEVEL_OUTOF10: 7
PAINLEVEL_OUTOF10: 3

## 2017-12-09 NOTE — PLAN OF CARE
Problem: Pain:  Goal: Pain level will decrease  Pain level will decrease   Outcome: Ongoing    Goal: Control of acute pain  Control of acute pain   Outcome: Ongoing      Problem: Falls - Risk of  Goal: Absence of falls  Outcome: Ongoing

## 2017-12-09 NOTE — PROGRESS NOTES
Pt offered Milk of Mag due to no BM and post op day 3. Pt refused and stated they will try it once the chest tubes and epidural are removed.

## 2017-12-10 ENCOUNTER — APPOINTMENT (OUTPATIENT)
Dept: GENERAL RADIOLOGY | Age: 52
DRG: 165 | End: 2017-12-10
Attending: THORACIC SURGERY (CARDIOTHORACIC VASCULAR SURGERY)
Payer: COMMERCIAL

## 2017-12-10 VITALS
TEMPERATURE: 98.1 F | WEIGHT: 184 LBS | BODY MASS INDEX: 27.25 KG/M2 | HEART RATE: 112 BPM | DIASTOLIC BLOOD PRESSURE: 86 MMHG | OXYGEN SATURATION: 95 % | RESPIRATION RATE: 18 BRPM | HEIGHT: 69 IN | SYSTOLIC BLOOD PRESSURE: 130 MMHG

## 2017-12-10 PROCEDURE — 71020 XR CHEST STANDARD TWO VW: CPT

## 2017-12-10 PROCEDURE — 2700000000 HC OXYGEN THERAPY PER DAY

## 2017-12-10 PROCEDURE — 2580000003 HC RX 258: Performed by: THORACIC SURGERY (CARDIOTHORACIC VASCULAR SURGERY)

## 2017-12-10 PROCEDURE — 99024 POSTOP FOLLOW-UP VISIT: CPT | Performed by: NURSE PRACTITIONER

## 2017-12-10 PROCEDURE — 6370000000 HC RX 637 (ALT 250 FOR IP): Performed by: THORACIC SURGERY (CARDIOTHORACIC VASCULAR SURGERY)

## 2017-12-10 PROCEDURE — 94640 AIRWAY INHALATION TREATMENT: CPT

## 2017-12-10 RX ORDER — OXYCODONE HYDROCHLORIDE 10 MG/1
10 TABLET ORAL EVERY 6 HOURS PRN
Qty: 50 TABLET | Refills: 0
Start: 2017-12-10 | End: 2020-07-06

## 2017-12-10 RX ADMIN — FAMOTIDINE 20 MG: 20 TABLET ORAL at 08:58

## 2017-12-10 RX ADMIN — IPRATROPIUM BROMIDE AND ALBUTEROL SULFATE 1 AMPULE: .5; 3 SOLUTION RESPIRATORY (INHALATION) at 07:23

## 2017-12-10 RX ADMIN — Medication 10 ML: at 08:58

## 2017-12-10 RX ADMIN — IPRATROPIUM BROMIDE AND ALBUTEROL SULFATE 1 AMPULE: .5; 3 SOLUTION RESPIRATORY (INHALATION) at 11:08

## 2017-12-10 RX ADMIN — OXYCODONE HYDROCHLORIDE 10 MG: 5 TABLET ORAL at 09:10

## 2017-12-10 RX ADMIN — MAGNESIUM HYDROXIDE 30 ML: 400 SUSPENSION ORAL at 05:52

## 2017-12-10 RX ADMIN — DOCUSATE SODIUM 100 MG: 100 CAPSULE, LIQUID FILLED ORAL at 08:58

## 2017-12-10 ASSESSMENT — PAIN SCALES - GENERAL
PAINLEVEL_OUTOF10: 0
PAINLEVEL_OUTOF10: 7

## 2017-12-10 NOTE — DISCHARGE SUMMARY
without difficulty. He was taken to the ICU for recovery. On POD 1, he was sitting up in the chair hemodynamically stable in good pain control. Right CT X 2 with moderate amount of serosanguinous drainage without air leak. CXR demonstrated normal postoperative changes. He was felt stable for transfer to telemetry for ongoing monitoring and therapy. Suction was discontinued to CT on POD 2 and were removed on POD 3. CXR rechecked on POD 4, demonstrating normal postoperative changes. He was ambulating without difficulty, pain well controlled and tolerating his diet. He was felt stable for discharge home. Disposition:  Home     Follow-up:    1. Hospital Follow-up with Dr. Lb Caldwell in week. 2. Appointment with Dr. Rosa Vu in 4 weeks for Routine Postoperative Follow-up with CXR. Discharge Medications:     acetaminophen (TYLENOL) 325 MG tablet  Take 650 mg by mouth     oxyCODONE (OXY-IR) 10 MG immediate release tablet  Take 1 tablet by mouth every 6 hours as needed for Pain .      Rosuvastatin Calcium (CRESTOR PO)  Take by mouth Indications: but hasnt been taking

## 2017-12-10 NOTE — PROGRESS NOTES
Discharge instructions given to pt. Explained to make follow up appointments. Prescription for pain medication given. Explained to continue home meds as directed. Gave postop instructions on thoracotomy. Pt had already removed tele monitor. Removed IV with no complications. Copy of AVS given to pt. Signed copy of AVS stored in soft paper chart.

## 2017-12-10 NOTE — PROGRESS NOTES
Physical Therapy  Name: Stu Mcgowan  MRN:  [id number]  Date of service:  12/10/2017     12/10/17 1221   General   Chart Reviewed Yes   Missed reason Patient declined   Subjective   Subjective I am fixing to take this IV out, the doctor said I could leave hours ago. What is the hold up? General Comment   Comments Checked with nursing. Discharge delayed due to no orders have been put in. Advised patient to please not remove the IV but told him if he did he needs to apply pressure to minimize bleeding.   Nsg informed of situation       Electronically signed by Frederick Clark PTA on 12/10/2017 at 12:24 PM

## 2017-12-30 DIAGNOSIS — C78.01 SECONDARY MALIGNANT NEOPLASM OF RIGHT LUNG (HCC): Primary | ICD-10-CM

## 2017-12-30 DIAGNOSIS — Z85.038 HISTORY OF COLON CANCER, STAGE III: ICD-10-CM

## 2018-01-02 ENCOUNTER — OFFICE VISIT (OUTPATIENT)
Dept: CARDIOTHORACIC SURGERY | Age: 53
End: 2018-01-02

## 2018-01-02 ENCOUNTER — HOSPITAL ENCOUNTER (OUTPATIENT)
Dept: GENERAL RADIOLOGY | Age: 53
Discharge: HOME | End: 2018-01-02
Payer: COMMERCIAL

## 2018-01-02 VITALS
DIASTOLIC BLOOD PRESSURE: 85 MMHG | WEIGHT: 185 LBS | HEART RATE: 95 BPM | OXYGEN SATURATION: 99 % | HEIGHT: 69 IN | BODY MASS INDEX: 27.4 KG/M2 | SYSTOLIC BLOOD PRESSURE: 116 MMHG

## 2018-01-02 DIAGNOSIS — Z85.038 HISTORY OF COLON CANCER, STAGE III: ICD-10-CM

## 2018-01-02 DIAGNOSIS — C78.01 SECONDARY MALIGNANT NEOPLASM OF RIGHT LUNG (HCC): Primary | ICD-10-CM

## 2018-01-02 DIAGNOSIS — C78.01 SECONDARY MALIGNANT NEOPLASM OF RIGHT LUNG (HCC): ICD-10-CM

## 2018-01-02 PROCEDURE — 99024 POSTOP FOLLOW-UP VISIT: CPT | Performed by: THORACIC SURGERY (CARDIOTHORACIC VASCULAR SURGERY)

## 2018-01-02 PROCEDURE — 71046 X-RAY EXAM CHEST 2 VIEWS: CPT

## 2018-09-28 ENCOUNTER — OFFICE VISIT (OUTPATIENT)
Dept: URGENT CARE | Age: 53
End: 2018-09-28
Payer: COMMERCIAL

## 2018-09-28 VITALS
TEMPERATURE: 98.3 F | BODY MASS INDEX: 27.32 KG/M2 | DIASTOLIC BLOOD PRESSURE: 80 MMHG | WEIGHT: 185 LBS | RESPIRATION RATE: 18 BRPM | SYSTOLIC BLOOD PRESSURE: 139 MMHG | HEART RATE: 85 BPM | OXYGEN SATURATION: 96 %

## 2018-09-28 DIAGNOSIS — J01.90 ACUTE SINUSITIS, RECURRENCE NOT SPECIFIED, UNSPECIFIED LOCATION: Primary | ICD-10-CM

## 2018-09-28 DIAGNOSIS — R05.9 COUGH: ICD-10-CM

## 2018-09-28 DIAGNOSIS — J40 BRONCHITIS: ICD-10-CM

## 2018-09-28 PROCEDURE — 96372 THER/PROPH/DIAG INJ SC/IM: CPT | Performed by: SPECIALIST

## 2018-09-28 PROCEDURE — 99202 OFFICE O/P NEW SF 15 MIN: CPT | Performed by: SPECIALIST

## 2018-09-28 RX ORDER — DEXAMETHASONE SODIUM PHOSPHATE 10 MG/ML
10 INJECTION INTRAMUSCULAR; INTRAVENOUS ONCE
Status: COMPLETED | OUTPATIENT
Start: 2018-09-28 | End: 2018-09-28

## 2018-09-28 RX ORDER — METHYLPREDNISOLONE 4 MG/1
TABLET ORAL
Qty: 1 KIT | Refills: 0 | Status: SHIPPED | OUTPATIENT
Start: 2018-09-28 | End: 2020-07-06

## 2018-09-28 RX ORDER — AMOXICILLIN 875 MG/1
875 TABLET, COATED ORAL 2 TIMES DAILY
Qty: 20 TABLET | Refills: 0 | Status: SHIPPED | OUTPATIENT
Start: 2018-09-28 | End: 2018-10-08

## 2018-09-28 RX ADMIN — DEXAMETHASONE SODIUM PHOSPHATE 10 MG: 10 INJECTION INTRAMUSCULAR; INTRAVENOUS at 14:21

## 2018-09-28 ASSESSMENT — ENCOUNTER SYMPTOMS
COUGH: 1
SINUS PRESSURE: 1
SINUS PAIN: 1
SORE THROAT: 1

## 2018-09-28 NOTE — PROGRESS NOTES
[address]  [address] [facility] [address]  Dept: [phone]  Loc: [phone]    Leena Glynn is a 48 y.o. male who presents today for his medical conditions/complaints as noted below. Leena Glynn is c/o of Cough and Head Congestion        HPI:     Sinusitis   This is a new problem. The current episode started in the past 7 days. The problem has been gradually worsening since onset. There has been no fever. The pain is moderate. Associated symptoms include congestion, coughing, sinus pressure and a sore throat. Past treatments include nothing. Past Medical History:   Diagnosis Date    [facility] ([facility] [address]     surgery only    COPD [facility]chronic obstructive pulmonary disease) ([facility]     Hyperlipidemia     Upper respiratory infection     recent tx with antibiotics and prednisone      Past Surgical History:   Procedure Laterality Date    [facility]      at [facility]    LOBECTOMY Right 12/6/2017    RIGHT THORACOTOMY; RIGHT LOWER  LOBECTOMY performed by Clinton Toney MD at [facility] History   Problem Relation Age of Onset    Other Mother         hx. benign lung nodule    Other Father         nasra damon       Social History   Substance Use Topics    Smoking status: Former Smoker     Packs/day: 2.00     Years: 25.00     Types: Cigarettes    Smokeless tobacco: Never Used      Comment: quit recently    Alcohol use 7.2 oz/week     12 Cans of beer per week      Comment: avg per week/mostly fri and sat nights      Current Outpatient Prescriptions   Medication Sig Dispense Refill    Apixaban (ELIQUIS PO) Take by mouth      methylPREDNISolone (MEDROL DOSEPACK) 4 MG tablet START Saturday, September 29, 2018. Take by mouth. 1 kit 0    amoxicillin (AMOXIL) 875 MG tablet Take 1 tablet by mouth 2 times daily for 10 days 20 tablet 0    oxyCODONE (OXY-IR) 10 MG immediate release tablet Take 1 tablet by mouth every 6 hours as needed for Pain .  48 your test results and keep a list of the medicines you take. How can you care for yourself at home? · Take all medicines exactly as prescribed. Call your doctor if you think you are having a problem with your medicine. · Get some extra rest.  · Take an over-the-counter pain medicine, such as acetaminophen (Tylenol), ibuprofen (Advil, Motrin), or naproxen (Aleve) to reduce fever and relieve body aches. Read and follow all instructions on the label. · Do not take two or more pain medicines at the same time unless the doctor told you to. Many pain medicines have acetaminophen, which is Tylenol. Too much acetaminophen (Tylenol) can be harmful. · Take an over-the-counter cough medicine that contains dextromethorphan to help quiet a dry, hacking cough so that you can sleep. Avoid cough medicines that have more than one active ingredient. Read and follow all instructions on the label. · Breathe moist air from a humidifier, hot shower, or sink filled with hot water. The heat and moisture will thin mucus so you can cough it out. · Do not smoke. Smoking can make bronchitis worse. If you need help quitting, talk to your doctor about stop-smoking programs and medicines. These can increase your chances of quitting for good. When should you call for help? Call 911 anytime you think you may need emergency care. For example, call if:    · You have severe trouble breathing.    Call your doctor now or seek immediate medical care if:    · You have new or worse trouble breathing.     · You cough up dark brown or bloody mucus (sputum).     · You have a new or higher fever.     · You have a new rash.    Watch closely for changes in your health, and be sure to contact your doctor if:    · You cough more deeply or more often, especially if you notice more mucus or a change in the color of your mucus.     · You are not getting better as expected. Where can you learn more? Go to https://chsamueleb.health-partners. org and sign in with hot water. Avoid cold, dry air. Using a humidifier in your home may help. Follow the directions for cleaning the machine. · Use saline (saltwater) nasal washes to help keep your nasal passages open and wash out mucus and bacteria. You can buy saline nose drops at a grocery store or drugstore. Or you can make your own at home by adding 1 teaspoon of salt and 1 teaspoon of baking soda to 2 cups of distilled water. If you make your own, fill a bulb syringe with the solution, insert the tip into your nostril, and squeeze gently. Nigel Colton your nose. · Put a hot, wet towel or a warm gel pack on your face 3 or 4 times a day for 5 to 10 minutes each time. · Try a decongestant nasal spray like oxymetazoline (Afrin). Do not use it for more than 3 days in a row. Using it for more than 3 days can make your congestion worse. When should you call for help? Call your doctor now or seek immediate medical care if:    · You have new or worse swelling or redness in your face or around your eyes.     · You have a new or higher fever.    Watch closely for changes in your health, and be sure to contact your doctor if:    · You have new or worse facial pain.     · The mucus from your nose becomes thicker (like pus) or has new blood in it.     · You are not getting better as expected. Where can you learn more? Go to https://[employer]. org and sign in to your [employer] account. Enter O563 in the App[employer] box to learn more about \"Sinusitis: Care Instructions. \"     If you do not have an account, please click on the \"Sign Up Now\" link. Current as of: May 12, 2017  Content Version: 11.7  © 3944-0054 [employer]. Care instructions adapted under license by [facility] ([facility]). If you have questions about a medical condition or this instruction, always ask your healthcare professional. [address] any warranty or liability for your use of this information.

## 2019-11-22 ENCOUNTER — OFFICE VISIT (OUTPATIENT)
Dept: HEMATOLOGY | Age: 54
End: 2019-11-22
Payer: COMMERCIAL

## 2019-11-22 ENCOUNTER — HOSPITAL ENCOUNTER (OUTPATIENT)
Dept: INFUSION THERAPY | Age: 54
Discharge: HOME | End: 2019-11-22
Payer: COMMERCIAL

## 2019-11-22 VITALS
DIASTOLIC BLOOD PRESSURE: 80 MMHG | HEART RATE: 87 BPM | OXYGEN SATURATION: 95 % | HEIGHT: 69 IN | SYSTOLIC BLOOD PRESSURE: 120 MMHG | BODY MASS INDEX: 27.32 KG/M2

## 2019-11-22 DIAGNOSIS — C78.00 COLON CANCER METASTASIZED TO LUNG (HCC): ICD-10-CM

## 2019-11-22 DIAGNOSIS — Z71.6 TOBACCO ABUSE COUNSELING: ICD-10-CM

## 2019-11-22 DIAGNOSIS — R91.1 PULMONARY NODULE: ICD-10-CM

## 2019-11-22 DIAGNOSIS — C78.00 COLON CANCER METASTASIZED TO LUNG (HCC): Primary | ICD-10-CM

## 2019-11-22 DIAGNOSIS — C18.9 COLON CANCER METASTASIZED TO LUNG (HCC): Primary | ICD-10-CM

## 2019-11-22 DIAGNOSIS — Z86.711 HISTORY OF PULMONARY EMBOLISM: ICD-10-CM

## 2019-11-22 DIAGNOSIS — C18.9 COLON CANCER METASTASIZED TO LUNG (HCC): ICD-10-CM

## 2019-11-22 PROCEDURE — 99213 OFFICE O/P EST LOW 20 MIN: CPT | Performed by: NURSE PRACTITIONER

## 2019-11-22 PROCEDURE — 85025 COMPLETE CBC W/AUTO DIFF WBC: CPT

## 2019-11-22 PROCEDURE — 99211 OFF/OP EST MAY X REQ PHY/QHP: CPT

## 2019-12-25 PROBLEM — Z86.711 HISTORY OF PULMONARY EMBOLISM: Status: ACTIVE | Noted: 2019-12-25

## 2019-12-25 ASSESSMENT — ENCOUNTER SYMPTOMS
EYE REDNESS: 0
ABDOMINAL PAIN: 0
BLOOD IN STOOL: 0
VOMITING: 0
SHORTNESS OF BREATH: 0
EYES NEGATIVE: 1
EYE PAIN: 0
SORE THROAT: 0
CONSTIPATION: 0
COUGH: 0
BACK PAIN: 0
WHEEZING: 0
EYE DISCHARGE: 0
RESPIRATORY NEGATIVE: 1
GASTROINTESTINAL NEGATIVE: 1
DIARRHEA: 0
NAUSEA: 0

## 2020-03-10 VITALS
OXYGEN SATURATION: 98 % | HEART RATE: 71 BPM | DIASTOLIC BLOOD PRESSURE: 82 MMHG | SYSTOLIC BLOOD PRESSURE: 132 MMHG | WEIGHT: 189 LBS | BODY MASS INDEX: 27.91 KG/M2

## 2020-05-01 ENCOUNTER — HOSPITAL ENCOUNTER (OUTPATIENT)
Dept: INFUSION THERAPY | Age: 55
End: 2020-05-01

## 2020-07-06 ENCOUNTER — OFFICE VISIT (OUTPATIENT)
Dept: HEMATOLOGY | Age: 55
End: 2020-07-06
Payer: COMMERCIAL

## 2020-07-06 ENCOUNTER — HOSPITAL ENCOUNTER (OUTPATIENT)
Dept: INFUSION THERAPY | Age: 55
Discharge: HOME | End: 2020-07-06
Payer: COMMERCIAL

## 2020-07-06 VITALS
TEMPERATURE: 99 F | OXYGEN SATURATION: 96 % | SYSTOLIC BLOOD PRESSURE: 140 MMHG | HEART RATE: 95 BPM | HEIGHT: 69 IN | WEIGHT: 187.1 LBS | DIASTOLIC BLOOD PRESSURE: 88 MMHG | BODY MASS INDEX: 27.71 KG/M2

## 2020-07-06 DIAGNOSIS — Z85.038 HISTORY OF COLON CANCER, STAGE III: ICD-10-CM

## 2020-07-06 LAB
ALBUMIN SERPL-MCNC: 4.2 G/DL (ref 3.5–5.2)
ALP BLD-CCNC: 88 U/L (ref 40–130)
ALT SERPL-CCNC: 17 U/L (ref 21–72)
ANION GAP SERPL CALCULATED.3IONS-SCNC: 7 MMOL/L (ref 7–19)
AST SERPL-CCNC: 23 U/L (ref 17–59)
BASOPHILS ABSOLUTE: 0.05 K/UL (ref 0.01–0.08)
BASOPHILS RELATIVE PERCENT: 0.5 % (ref 0.1–1.2)
BILIRUB SERPL-MCNC: 0.4 MG/DL (ref 0.2–1.3)
BUN BLDV-MCNC: 15 MG/DL (ref 9–20)
CALCIUM SERPL-MCNC: 9.6 MG/DL (ref 8.4–10.2)
CEA: 6.5 NG/ML (ref 0–3)
CHLORIDE BLD-SCNC: 107 MMOL/L (ref 98–111)
CO2: 27 MMOL/L (ref 22–29)
CREAT SERPL-MCNC: 0.7 MG/DL (ref 0.6–1.2)
EOSINOPHILS ABSOLUTE: 0.14 K/UL (ref 0.04–0.54)
EOSINOPHILS RELATIVE PERCENT: 1.4 % (ref 0.7–7)
GFR NON-AFRICAN AMERICAN: >60
GLOBULIN: 3.2 G/DL
GLUCOSE BLD-MCNC: 108 MG/DL (ref 74–106)
HCT VFR BLD CALC: 49.6 % (ref 40.1–51)
HEMOGLOBIN: 16.6 G/DL (ref 13.7–17.5)
LYMPHOCYTES ABSOLUTE: 2.62 K/UL (ref 1.18–3.74)
LYMPHOCYTES RELATIVE PERCENT: 25.6 % (ref 19.3–53.1)
MCH RBC QN AUTO: 30.5 PG (ref 25.7–32.2)
MCHC RBC AUTO-ENTMCNC: 33.5 G/DL (ref 32.3–36.5)
MCV RBC AUTO: 91.2 FL (ref 79–92.2)
MONOCYTES ABSOLUTE: 0.66 K/UL (ref 0.24–0.82)
MONOCYTES RELATIVE PERCENT: 6.4 % (ref 4.7–12.5)
NEUTROPHILS ABSOLUTE: 6.78 K/UL (ref 1.56–6.13)
NEUTROPHILS RELATIVE PERCENT: 66.1 % (ref 34–71.1)
PDW BLD-RTO: 14.3 % (ref 11.6–14.4)
PLATELET # BLD: 228 K/UL (ref 163–337)
PMV BLD AUTO: 10.5 FL (ref 7.4–10.4)
POTASSIUM SERPL-SCNC: 4 MMOL/L (ref 3.5–5.1)
RBC # BLD: 5.44 M/UL (ref 4.63–6.08)
SODIUM BLD-SCNC: 141 MMOL/L (ref 137–145)
TOTAL PROTEIN: 7.5 G/DL (ref 6.3–8.2)
WBC # BLD: 10.25 K/UL (ref 4.23–9.07)

## 2020-07-06 PROCEDURE — 82378 CARCINOEMBRYONIC ANTIGEN: CPT

## 2020-07-06 PROCEDURE — 99213 OFFICE O/P EST LOW 20 MIN: CPT | Performed by: NURSE PRACTITIONER

## 2020-07-06 PROCEDURE — 99211 OFF/OP EST MAY X REQ PHY/QHP: CPT

## 2020-07-06 PROCEDURE — 80053 COMPREHEN METABOLIC PANEL: CPT

## 2020-07-06 PROCEDURE — 85025 COMPLETE CBC W/AUTO DIFF WBC: CPT

## 2020-07-06 ASSESSMENT — ENCOUNTER SYMPTOMS
WHEEZING: 0
EYE PAIN: 0
EYES NEGATIVE: 1
BLOOD IN STOOL: 0
ABDOMINAL PAIN: 0
VOMITING: 0
RESPIRATORY NEGATIVE: 1
SHORTNESS OF BREATH: 0
SORE THROAT: 0
GASTROINTESTINAL NEGATIVE: 1
NAUSEA: 0
CONSTIPATION: 0
COUGH: 0
BACK PAIN: 0
EYE REDNESS: 0
EYE DISCHARGE: 0
DIARRHEA: 0

## 2020-07-06 NOTE — PROGRESS NOTES
Progress Note      Pt Name: Radha Zamudio  YOB: 1965  MRN: [id number]    Date of evaluation: 7/6/2020  History Obtained From:  patient, electronic medical record    CHIEF COMPLAINT:    Chief Complaint   Patient presents with    Colon Cancer     Colon cancer metastasized to lung     HISTORY OF PRESENT ILLNESS:    Radha Zamudio is a 47 y.o.  male with significant PMH of rectal carcinoma, stage IIIb diagnosed in November 2016. He declined systemic adjuvant chemotherapy on multiple occasions and unfortunately developed metastatic disease to the right lung in August 2017. He is status post right lower lobectomy on 12/6/2017 and has continued to show no evidence of progression of disease. William Reyes returns today in scheduled follow-up for evaluation, review CT scan results, lab monitoring and further recommendations. He presents today with no new complaints and indicates that he feels great Jaguar returns today in his routine scheduled six-month follow-up. He presents today with no new complaints indicating that he feels great. He denies any respiratory or GI complaints. Jaguar reports having a repeat colonoscopy on 2/21/2020 by Dr. Frandy Griffith, he reports that his colonoscopy was without concerning findings and he has a recall colonoscopy in February 2023. Unable to view results from colonoscopy in care everywhere, will request a copy of the colonoscopy results. CT scan of the chest on 7/2/2020 was stable with no radiographic evidence suggesting recurrent disease. The known borderline enlarged lymph nodes in the right peritracheal and mid right hilar areas are unchanged compared to CT scan of the chest on 4/12/2019. Advanced diffuse emphysema is noted. CBC today continues to show mild neutrophilic leukocytosis with a WBC of 10.25 and an ANC of 6.78. He denies any febrile illness and reports he is currently smoking 1 pack of cigarettes daily.   Suspect mild elevation may be secondary to tobacco abuse. Seymour Schwab will continue to be monitored based upon NCCN surveillance guidelines for colon cancer:  History and physical along with a CEA every 3-6 months for 2 years   After 2 years, history and physicaln every 6 months for 5 years. CT scan every 6-12 months ×5 years. ONCOLOGIC HISTORY:   Diagnosis   Stage IIIB colon cancer , November 2017   Recurrence single nodule RLL, August 2017   Pulmonary emboli in the RLL artery, 1/22/2018    Treatment summary  January 2017- He declined Adjuvant therapy   RLL lobectomy December 2017   Eliquis initiated on 2/13/2018-January 2019    Cancer history- colorectal stage IIIB node positive  The patient is a pleasant 46years old male first seen by me on 1/3/2017 referred by Dr. Joshua Combs for a diagnosis of stage IIIB colorectal adenocarcinoma status post laparoscopic assisted low anterior resection with primary anastomosis. 11/2/2016-he was seen by gastroenterologist due to a history of change in her bowel habits for the last 6 months and blood in the stools x one time. Father had a history of colon polyps. 11/18/2016- he underwent a colonoscopy with Dr. Mary Santillan with the findings of a stenosing lesion at 15 cm from the anal verge. In addition, he was found to have several other polyps consistent with tubular adenoma high-grade. 11/30/2016- the patient underwent a low anterior resection laparoscopic-assisted by Dr. Joshua Combs at [facility] for a invasive moderate to poorly differentiated adenocarcinoma extending into subserosa adipose tissue with 4 out of 15 lymph nodes involved by metastatic disease. Margins were clear. Radial margin free of malignancy. No perineural invasion. No lymphovascular invasion. Final pathologic staging [id number] stage IIIB   December 7509 -Complicated postoperative course with an anastomotic leak and perirectal inflammation/infection. This is now resolving.    12/15/2016-CT scan of abdomen and pelvis revealed no focal liver or pancreas lesion. Mild stranding at the level of previous surgery as well as some presacral thickening. A small fluid/air collection within the presacral soft tissue measuring 2.4 cm in size, which contains some air. No significant lymphadenopathy noted. 1/3/2017-referred for discussion of adjuvant chemoradiation, declined treatment at present time. He was contemplative regarding the side effects and a small absolute benefit of 6% from the chemotherapy. He was explained in detail about the risk of local recurrence and distant metastasis in verbalized an understanding but he denied adjuvant chemotherapy or radiation at that time. Concerns and risk were thoroughly discussed with Jaguar. 1/9/2017- CT scan of chest revealed no obvious adrenal pathology or significant adenopathy appreciated. COPD with no obvious lung nodularity other than occasional calcified granuloma. 3/24/2017-declined systemic adjuvant chemotherapy or RT.   08/04/2017- Surveillance CT scan of the abdomen was performed for a raising CEA at 6.5. The abdomen and pelvis was unremarkable but a 19 x 10 mm nodule was visualized in the right lower lobe.   08/11/2017- CT scan of the chest showed a 1.7 cm right lower lobe nodule abutting the medial pleura. Additional 6 mm nodule in the right middle lobe. 8/17/2017- PET CT scan showed a right lower lobe nodule measuring 1.8 cm (SUV 9.3). No abnormal lymph node activity within the mediastinum or hilar regions. The small right middle lobe pulmonary nodule does not demonstrate an increased activity. However this nodule measures only 6 mm in size and could be below the limits of resolution. Otherwise no other evidence of metastatic disease. He was   9/15/2017- the patient was seen by me for discussion of his scans. He has also been seen by Dr. Phoebe Oneill was plans for surgery (lobectomy) in early November 2017.   12/6/2017- Right lower lobectomy by Dr Yates Kempner.  Consistent with metastatic colonic adenocarcinoma. 5 mediastinal lymph nodes negative. 1/22/2018- 1Year recall colonoscopy by Dr. Charlesetta Alpers documented a total of 6 polyps removed. Pathology documented all 6 polyps as having no evidence of malignancy. 1/22/2018- CT scan of the abdomen and pelvis documented a tiny nodule in the right adrenal gland that measures less than 1 cm in size and likely represents an adenoma, this has been present since 12/15/2016. No mass, lymphadenopathy or fluid collection is noted. The osseous structures did not demonstrate any evidence of bone metastasis. 2/22/2018- CT scan of the chest documented moderate emphysematous changes again noted in bilateral lungs. The previously noted nodule in the RLL has been resected. A small complex pleural fluid collection is in the posterior medial right lower thorax. Previously measured right hilar lymph node now measures 8 mm, previously measuring 1.5 cm. A pulmonary embolus in the stump of the right lower lobar artery. 5/25/2018 - CT scan of the abdomen and pelvis documented no evidence of locally recurrent metastatic disease. 5/25/2018- CT scan of the chest documented no significant interval change from previous exam. Embolus stump in the pulmonary artery to the right lower lobe redemonstrated. No new enlarged axillary, hilar or mediastinal lymphadenopathy. Right hilar lymph node measures 9 mm with no change from previous exam.   12/7/2018- CT scan of abdomen and pelvis with contrast documented no evidence metastatic disease in the abdomen or pelvis. 12/7/2018- CT scan of the chest with contrast documented the pulmonary arteries being within normal limits. Mediastinal and hilar lymph nodes are stable since previous study. There is no interval enlargement or change. No evidence of recurrent or metastatic disease in the chest   4/12/2019- CT scan of the abdomen and pelvis with contrast documented no evidence of neoplastic/metastatic disease. No acute intra-abdominal process. 4/12/2019- CT scan of the chest with contrast documented right paratracheal node measuring 7 mm AP window node measuring 7 mm, right hilar node measuring 13 mm and left hilar node measuring 11 mm, all stable with no change compared to CT scan on 1/22/2018. No new developing mediastinal or hilar lymphadenopathy. Pulmonary emphysema identified. 2/21/2020-colonoscopy by Dr. Shana Moore reports that his colonoscopy was without concerning findings and he has a recall colonoscopy in February 2023. Unable to view results from colonoscopy in care everywhere, will request a copy of the colonoscopy results. 7/2/2020 - CT scan of the chest with contrast was stable with no radiographic evidence suggesting recurrent disease. The known borderline enlarged lymph nodes in the right peritracheal and mid right hilar areas are unchanged compared to CT scan of the chest on 4/12/2019. Advanced diffuse emphysema is noted. Past Medical History:    Past Medical History:   Diagnosis Date    [facility] [address]     surgery only    COPD [facility]chronic obstructive pulmonary disease) [facility] [address]     Hyperlipidemia     Upper respiratory infection     recent tx with antibiotics and prednisone       Past Surgical History:    Past Surgical History:   Procedure Laterality Date    CARDIAC CATHETERIZATION      COLECTOMY      [facility]    LOBECTOMY Right 12/6/2017    RIGHT THORACOTOMY; RIGHT LOWER  LOBECTOMY performed by Elizabet Noble MD at [place] [address]         Current Medications:    Current Outpatient Medications   Medication Sig Dispense Refill    acetaminophen (TYLENOL) 325 MG tablet Take 650 mg by mouth       No current facility-administered medications for this visit.          Allergies: No Known Allergies    Social History:    Social History     Tobacco Use    Smoking status: Former Smoker     Packs/day: 2.00     Years: 25.00     Pack years: 50.00     Types: Cigarettes    Smokeless tobacco: Never Used  Tobacco comment: quit recently   Substance Use Topics    Alcohol use: Yes     Alcohol/week: 12.0 standard drinks     Types: 12 Cans of beer per week     Comment: avg per week/mostly fri and sat nights    Drug use: No       Family History:   Family History   Problem Relation Age of Onset    Other Mother         hx. benign lung nodule    Other Father         nasra damon    Colon Polyps Father        Vitals:  Vitals:    07/06/20 1414   BP: (!) 140/88   Pulse: 95   Temp: 99 °F (37.2 °C)   SpO2: 96%   Weight: 187 lb 1.6 oz (84.9 kg)   Height: 5' 9\" (1.753 m)        Subjective   REVIEW OF SYSTEMS:   Review of Systems   Constitutional: Negative. Negative for chills, diaphoresis and fever. HENT: Negative. Negative for congestion, ear pain, hearing loss, nosebleeds, sore throat and tinnitus. Eyes: Negative. Negative for pain, discharge and redness. Respiratory: Negative. Negative for cough, shortness of breath and wheezing. Cardiovascular: Negative. Negative for chest pain, palpitations and leg swelling. Gastrointestinal: Negative. Negative for abdominal pain, blood in stool, constipation, diarrhea, nausea and vomiting. Endocrine: Negative for polydipsia. Genitourinary: Negative for dysuria, flank pain, frequency, hematuria and urgency. Musculoskeletal: Negative. Negative for back pain, myalgias and neck pain. Skin: Negative. Negative for rash. Neurological: Negative. Negative for dizziness, tremors, seizures, weakness and headaches. Hematological: Does not bruise/bleed easily. Psychiatric/Behavioral: Negative. The patient is not nervous/anxious. Objective   PHYSICAL EXAM:  Physical Exam  Vitals signs reviewed. Constitutional:       General: He is not in acute distress. Appearance: He is well-developed. He is not diaphoretic. HENT:      Head: Normocephalic and atraumatic. Mouth/Throat:      Pharynx: Uvula midline. Tonsils: No tonsillar exudate.    Eyes: General: Lids are normal. No scleral icterus. Right eye: No discharge. Left eye: No discharge. Conjunctiva/sclera: Conjunctivae normal.      Pupils: Pupils are equal, round, and reactive to light. Neck:      Musculoskeletal: Normal range of motion and neck supple. Thyroid: No thyroid mass or thyromegaly. Vascular: No JVD. Trachea: Trachea normal. No tracheal deviation. Cardiovascular:      Rate and Rhythm: Normal rate and regular rhythm. Heart sounds: Normal heart sounds. No murmur. No friction rub. No gallop. Pulmonary:      Effort: Pulmonary effort is normal. No respiratory distress. Breath sounds: Normal breath sounds. No wheezing or rales. Chest:      Chest wall: No tenderness. Abdominal:      General: Bowel sounds are normal. There is no distension. Palpations: Abdomen is soft. There is no mass. Tenderness: There is no abdominal tenderness. There is no guarding or rebound. Hernia: No hernia is present. Musculoskeletal:         General: No tenderness or deformity. Comments: Range of motion within normal limits x4 extremities   Skin:     General: Skin is warm. Coloration: Skin is not pale. Findings: No erythema or rash. Neurological:      Mental Status: He is alert and oriented to person, place, and time. Cranial Nerves: No cranial nerve deficit. Coordination: Coordination normal.   Psychiatric:         Behavior: Behavior normal.         Thought Content: Thought content normal.         Labs:   CBC:   Recent Labs     07/06/20  1426   WBC 10.25*   HGB 16.6        CMP:   Recent Labs     07/06/20  1522   GLUCOSE 108*   BUN 15   CREATININE 0.7   CO2 27   CALCIUM 9.6   ALKPHOS 88   AST 23   ALT 17*     Hepatic:   Recent Labs     07/06/20  1522   AST 23   ALT 17*   BILITOT 0.4   ALKPHOS 88     Troponin: No results for input(s): TROPONINI in the last 72 hours.   BNP: No results for input(s): BNP in the last 72 hours. Lipids: No results for input(s): CHOL, HDL in the last 72 hours. Invalid input(s): LDL  INR: No results for input(s): INR in the last 72 hours. ABG: No results for input(s): PHART, [id number], PO2ART, AHL6APK, P0AKDQKE, BEART in the last 72 hours. 30 Day lookback of cultures:    Blood Culture Recent: No results for input(s): BC in the last 720 hours. Gram Stain Recent: No results for input(s): LABGRAM in the last 720 hours. Resp Culture Recent: No results for input(s): CULTRESP in the last 720 hours. Body Fluid Recent : No results for input(s): BFCX in the last 720 hours. MRSA Recent : No results for input(s): [address] in the last 720 hours. Urine Culture Recent : No results for input(s): LABURIN in the last 720 hours. Organism Recent : No results for input(s): ORG in the last 720 hours. ASSESSMENT/PLAN:      1. Colon cancer metastasized to lung [facility]), stage IIIb November 2016. Right lower lobe lung nodule December 2017, status post lobectomy. CT scan of the abdomen and pelvis on 4/12/2019 was without evidence of recurrent disease. Colonoscopy on 2/21/2020 was without evidence of disease and a 2-year recall has been requested by Dr. Sammi Vang. CT scan of the chest on 7/2/2020 also documented no evidence of recurrent disease. Denies any GI complaints or respiratory complaints. NCCN guidelines are for CT scan of the chest, abdomen and pelvis every 6 to 12 months for 5 years. 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 He agreed to repeat CT scan of the chest, abdomen and pelvis in July 2021, sooner if he becomes symptomatic.    - CBC Auto Differential; Future  -CMP today    2. Chronically elevated CEA, suspect secondary to tobacco use. CEA of 6.8 in March 2017 and CEA of 6.7 on 5/10/2019  - CEA today and at every 6-month follow-up visit    3. History of pulmonary embolism, completed 11 months of anticoagulation. No recurrent PE.   Is asymptomatic denying any shortness of breath or chest pain. 4.  Tobacco abuse counseling, continues to smoke 1 pack cigarettes daily  We talked about the importance of quitting smoking. Specifically, we discussed the risk related tobacco including but not limited to carcinoma, cardiovascular disease, stroke, and financial loss. I advised to quit smoking and offered resources to include nicotine patches to help with the craving. The patient is contemplated at this time. I will follow-up on smoking cessation during the next visit and continue to encourage quitting tobacco use. FOLLOW UP:  1. Follow-up appointment given for 6 months, sooner if needed  2. Follow-up with other medical providers as recommended    Over 50% of the total visit time of 15 minutes in face to face encounter with the patient, out of which more than 50% of the time was spent in counseling patient  and coordination of care. Counseling included but was not limited to time spent reviewing labs, imaging studies/ treatment plan and answering questions. This does not include charting. Discussed precautions related to [address] and being at increased risk. Discussed proper handwashing to be done frequently, limit exposure to other individuals and maintain social distancing of 6 feet. Recommend contacting primary care provider if having respiratory symptoms for further recommendations and consideration for testing.     (Please note that portions of this note were completed with a voice recognition program. Efforts were made to edit the dictations but occasionally words are mis-transcribed.)    Electronically signed by JACLYN Stevens on 7/8/2020 at 11:05 AM

## 2020-07-08 PROBLEM — Z71.6 TOBACCO ABUSE COUNSELING: Status: ACTIVE | Noted: 2020-07-08

## 2020-07-08 PROBLEM — R97.0 ELEVATED CEA: Status: ACTIVE | Noted: 2020-07-08

## 2021-01-08 ENCOUNTER — HOSPITAL ENCOUNTER (OUTPATIENT)
Dept: INFUSION THERAPY | Age: 56
End: 2021-01-08
Payer: COMMERCIAL

## 2021-02-05 ENCOUNTER — HOSPITAL ENCOUNTER (OUTPATIENT)
Dept: INFUSION THERAPY | Age: 56
Discharge: HOME | End: 2021-02-05
Payer: COMMERCIAL

## 2021-02-05 ENCOUNTER — OFFICE VISIT (OUTPATIENT)
Dept: HEMATOLOGY | Age: 56
End: 2021-02-05
Payer: COMMERCIAL

## 2021-02-05 VITALS
HEART RATE: 90 BPM | OXYGEN SATURATION: 95 % | DIASTOLIC BLOOD PRESSURE: 88 MMHG | HEIGHT: 69 IN | BODY MASS INDEX: 27.74 KG/M2 | WEIGHT: 187.3 LBS | TEMPERATURE: 97.1 F | SYSTOLIC BLOOD PRESSURE: 142 MMHG

## 2021-02-05 DIAGNOSIS — C18.9 COLON CANCER METASTASIZED TO LUNG (HCC): Primary | ICD-10-CM

## 2021-02-05 DIAGNOSIS — C18.9 COLON CANCER METASTASIZED TO LUNG (HCC): ICD-10-CM

## 2021-02-05 DIAGNOSIS — Z86.711 HISTORY OF PULMONARY EMBOLISM: ICD-10-CM

## 2021-02-05 DIAGNOSIS — Z71.6 TOBACCO ABUSE COUNSELING: ICD-10-CM

## 2021-02-05 DIAGNOSIS — Z85.038 HISTORY OF COLON CANCER, STAGE III: ICD-10-CM

## 2021-02-05 DIAGNOSIS — R97.0 ELEVATED CEA: ICD-10-CM

## 2021-02-05 DIAGNOSIS — C78.00 COLON CANCER METASTASIZED TO LUNG (HCC): ICD-10-CM

## 2021-02-05 DIAGNOSIS — C78.00 COLON CANCER METASTASIZED TO LUNG (HCC): Primary | ICD-10-CM

## 2021-02-05 LAB
ALBUMIN SERPL-MCNC: 4 G/DL (ref 3.5–5.2)
ALP BLD-CCNC: 94 U/L (ref 40–130)
ALT SERPL-CCNC: 15 U/L (ref 21–72)
ANION GAP SERPL CALCULATED.3IONS-SCNC: 7 MMOL/L (ref 7–19)
AST SERPL-CCNC: 19 U/L (ref 17–59)
BASOPHILS ABSOLUTE: 0.06 K/UL (ref 0.01–0.08)
BASOPHILS RELATIVE PERCENT: 0.5 % (ref 0.1–1.2)
BILIRUB SERPL-MCNC: 0.4 MG/DL (ref 0.2–1.3)
BUN BLDV-MCNC: 15 MG/DL (ref 9–20)
CALCIUM SERPL-MCNC: 9.2 MG/DL (ref 8.4–10.2)
CEA: 7.2 NG/ML (ref 0–3)
CHLORIDE BLD-SCNC: 107 MMOL/L (ref 98–111)
CO2: 26 MMOL/L (ref 22–29)
CREAT SERPL-MCNC: 0.9 MG/DL (ref 0.6–1.2)
EOSINOPHILS ABSOLUTE: 0.19 K/UL (ref 0.04–0.54)
EOSINOPHILS RELATIVE PERCENT: 1.7 % (ref 0.7–7)
GFR NON-AFRICAN AMERICAN: >60
GLOBULIN: 2.6 G/DL
GLUCOSE BLD-MCNC: 102 MG/DL (ref 74–106)
HCT VFR BLD CALC: 46.3 % (ref 40.1–51)
HEMOGLOBIN: 16.5 G/DL (ref 13.7–17.5)
LYMPHOCYTES ABSOLUTE: 2.77 K/UL (ref 1.18–3.74)
LYMPHOCYTES RELATIVE PERCENT: 25.2 % (ref 19.3–53.1)
MCH RBC QN AUTO: 30.8 PG (ref 25.7–32.2)
MCHC RBC AUTO-ENTMCNC: 35.6 G/DL (ref 32.3–36.5)
MCV RBC AUTO: 86.4 FL (ref 79–92.2)
MONOCYTES ABSOLUTE: 0.68 K/UL (ref 0.24–0.82)
MONOCYTES RELATIVE PERCENT: 6.2 % (ref 4.7–12.5)
NEUTROPHILS ABSOLUTE: 7.29 K/UL (ref 1.56–6.13)
NEUTROPHILS RELATIVE PERCENT: 66.4 % (ref 34–71.1)
PDW BLD-RTO: 13.9 % (ref 11.6–14.4)
PLATELET # BLD: 316 K/UL (ref 163–337)
PMV BLD AUTO: 8.8 FL (ref 7.4–10.4)
POTASSIUM SERPL-SCNC: 4.2 MMOL/L (ref 3.5–5.1)
RBC # BLD: 5.36 M/UL (ref 4.63–6.08)
SODIUM BLD-SCNC: 140 MMOL/L (ref 137–145)
TOTAL PROTEIN: 6.6 G/DL (ref 6.3–8.2)
WBC # BLD: 10.99 K/UL (ref 4.23–9.07)

## 2021-02-05 PROCEDURE — 80053 COMPREHEN METABOLIC PANEL: CPT

## 2021-02-05 PROCEDURE — 85025 COMPLETE CBC W/AUTO DIFF WBC: CPT

## 2021-02-05 PROCEDURE — 99211 OFF/OP EST MAY X REQ PHY/QHP: CPT

## 2021-02-05 PROCEDURE — 99213 OFFICE O/P EST LOW 20 MIN: CPT | Performed by: NURSE PRACTITIONER

## 2021-02-05 PROCEDURE — 82378 CARCINOEMBRYONIC ANTIGEN: CPT

## 2021-02-05 ASSESSMENT — ENCOUNTER SYMPTOMS
COUGH: 0
EYE REDNESS: 0
RESPIRATORY NEGATIVE: 1
CONSTIPATION: 0
GASTROINTESTINAL NEGATIVE: 1
SORE THROAT: 0
EYE PAIN: 0
BLOOD IN STOOL: 0
SHORTNESS OF BREATH: 0
DIARRHEA: 0
NAUSEA: 0
EYE DISCHARGE: 0
WHEEZING: 0
BACK PAIN: 0
ABDOMINAL PAIN: 0
EYES NEGATIVE: 1
VOMITING: 0

## 2021-02-05 NOTE — PROGRESS NOTES
Progress Note      Pt Name: Babs Horvath  YOB: 1965  MRN: [id number]    Date of evaluation: 2/5/2021  History Obtained From:  patient, electronic medical record    CHIEF COMPLAINT:    Chief Complaint   Patient presents with    Follow-up     History of colon cancer, stage III    Other     Metastatic disease to the lung     HISTORY OF PRESENT ILLNESS:    Babs Horvath is a 54 y.o.  male with significant PMH of rectal carcinoma, stage IIIb diagnosed in November 2016. He declined systemic adjuvant chemotherapy on multiple occasions and unfortunately developed metastatic disease to the right lung in August 2017. He is status post right lower lobectomy on 12/6/2017 and has continued to show no evidence of progression of disease. Last CT scan of the chest was completed on 7/2/2020 documented no evidence of recurrent disease. His last colonoscopy was on 2/21/2020 by Dr. Candy Chambers with no concerning findings and recall is recommended for February 2023. Isai Reyes returns today in scheduled follow-up for evaluation, lab monitoring and further treatment recommendations. He presents today indicating that he feels great and this is the best that he has felt in many many years. He denies any GI or respiratory complaints. Isai Reyes is very reluctant to continue with routine surveillance as recommended by NCCN guidelines. He voices frustration of the need for continued radiographic surveillance. He is in agreement to repeat a CT scan of the chest in July, 1 year post last CT scan. CBC was reviewed today, is in within acceptable limits and is as documented below. CEA of 6.5 was documented on 7/6/2020 and is chronically elevated, suspect elevation is secondary to tobacco use. Isai Reyes will continue to be monitored based upon NCCN surveillance guidelines for colon cancer:  After 2 years, history and physicaln every 6 months for 5 years. CT scan every 6-12 months ×5 years.     ONCOLOGIC HISTORY: 1/3/2017-referred for discussion of adjuvant chemoradiation, declined treatment at present time. He was contemplative regarding the side effects and a small absolute benefit of 6% from the chemotherapy. He was explained in detail about the risk of local recurrence and distant metastasis in verbalized an understanding but he denied adjuvant chemotherapy or radiation at that time. Concerns and risk were thoroughly discussed with [facility]. 1/9/2017- CT scan of chest revealed no obvious adrenal pathology or significant adenopathy appreciated. COPD with no obvious lung nodularity other than occasional calcified granuloma. 3/24/2017-declined systemic adjuvant chemotherapy or RT.   08/04/2017- Surveillance CT scan of the abdomen was performed for a raising CEA at 6.5. The abdomen and pelvis was unremarkable but a 19 x 10 mm nodule was visualized in the right lower lobe.   08/11/2017- CT scan of the chest showed a 1.7 cm right lower lobe nodule abutting the medial pleura. Additional 6 mm nodule in the right middle lobe. 8/17/2017- PET CT scan showed a right lower lobe nodule measuring 1.8 cm (SUV 9.3). No abnormal lymph node activity within the mediastinum or hilar regions. The small right middle lobe pulmonary nodule does not demonstrate an increased activity. However this nodule measures only 6 mm in size and could be below the limits of resolution. Otherwise no other evidence of metastatic disease. He was   9/15/2017- the patient was seen by me for discussion of his scans. He has also been seen by Dr. Andres Sevilla was plans for surgery (lobectomy) in early November 2017.   12/6/2017- Right lower lobectomy by Dr Lan Condon. Consistent with metastatic colonic adenocarcinoma. 5 mediastinal lymph nodes negative. 1/22/2018- 1Year recall colonoscopy by Dr. Tree Seay documented a total of 6 polyps removed. Pathology documented all 6 polyps as having no evidence of malignancy. 1/22/2018- CT scan of the abdomen and pelvis documented a tiny nodule in the right adrenal gland that measures less than 1 cm in size and likely represents an adenoma, this has been present since 12/15/2016. No mass, lymphadenopathy or fluid collection is noted. The osseous structures did not demonstrate any evidence of bone metastasis. 2/22/2018- CT scan of the chest documented moderate emphysematous changes again noted in bilateral lungs. The previously noted nodule in the RLL has been resected. A small complex pleural fluid collection is in the posterior medial right lower thorax. Previously measured right hilar lymph node now measures 8 mm, previously measuring 1.5 cm. A pulmonary embolus in the stump of the right lower lobar artery. 5/25/2018 - CT scan of the abdomen and pelvis documented no evidence of locally recurrent metastatic disease. 5/25/2018- CT scan of the chest documented no significant interval change from previous exam. Embolus stump in the pulmonary artery to the right lower lobe redemonstrated. No new enlarged axillary, hilar or mediastinal lymphadenopathy. Right hilar lymph node measures 9 mm with no change from previous exam.   12/7/2018- CT scan of abdomen and pelvis with contrast documented no evidence metastatic disease in the abdomen or pelvis. 12/7/2018- CT scan of the chest with contrast documented the pulmonary arteries being within normal limits. Mediastinal and hilar lymph nodes are stable since previous study. There is no interval enlargement or change. No evidence of recurrent or metastatic disease in the chest   4/12/2019- CT scan of the abdomen and pelvis with contrast documented no evidence of neoplastic/metastatic disease. No acute intra-abdominal process. 4/12/2019- CT scan of the chest with contrast documented right paratracheal node measuring 7 mm AP window node measuring 7 mm, right hilar node measuring 13 mm and left hilar node measuring 11 mm, all stable with no change compared to CT scan on 1/22/2018. No new developing mediastinal or hilar lymphadenopathy. Pulmonary emphysema identified. 2/21/2020-colonoscopy by Dr. Giovanni Beckman reports that his colonoscopy was without concerning findings and he has a recall colonoscopy in February 2023. Unable to view results from colonoscopy in care everywhere, will request a copy of the colonoscopy results. 7/2/2020 - CT scan of the chest with contrast was stable with no radiographic evidence suggesting recurrent disease. The known borderline enlarged lymph nodes in the right peritracheal and mid right hilar areas are unchanged compared to CT scan of the chest on 4/12/2019. Advanced diffuse emphysema is noted. Past Medical History:    Past Medical History:   Diagnosis Date    Colon [facility] ([facility] [address]     surgery only    COPD (chronic obstructive pulmonary disease) ([facility] [address]     Hyperlipidemia     Upper respiratory infection     recent tx with antibiotics and prednisone       Past Surgical History:    Past Surgical History:   Procedure Laterality Date    CARDIAC CATHETERIZATION      COLECTOMY      [facility]    LOBECTOMY Right 12/6/2017    RIGHT THORACOTOMY; RIGHT LOWER  LOBECTOMY performed by Myah Shaikh MD at [place] [address]         Current Medications:    Current Outpatient Medications   Medication Sig Dispense Refill    acetaminophen (TYLENOL) 325 MG tablet Take 650 mg by mouth       No current facility-administered medications for this visit.          Allergies: No Known Allergies    Social History:    Social History     Tobacco Use    Smoking status: Former Smoker     Packs/day: 2.00     Years: 25.00     Pack years: 50.00     Types: Cigarettes    Smokeless tobacco: Never Used  Tobacco comment: quit recently   Substance Use Topics    Alcohol use: Yes     Alcohol/week: 12.0 standard drinks     Types: 12 Cans of beer per week     Comment: avg per week/mostly fri and sat nights    Drug use: No       Family History:   Family History   Problem Relation Age of Onset    Other Mother         hx. benign lung nodule    Other Father         nasra damon    Colon Polyps Father        Vitals:  Vitals:    02/05/21 1245   BP: (!) 142/88   Pulse: 90   Temp: 97.1 °F (36.2 °C)   TempSrc: Temporal   SpO2: 95%   Weight: 187 lb 4.8 oz (85 kg)   Height: 5' 9\" (1.753 m)        Subjective   REVIEW OF SYSTEMS:   Review of Systems   Constitutional: Negative. Negative for chills, diaphoresis and fever. HENT: Negative. Negative for congestion, ear pain, hearing loss, nosebleeds, sore throat and tinnitus. Eyes: Negative. Negative for pain, discharge and redness. Respiratory: Negative. Negative for cough, shortness of breath and wheezing. Cardiovascular: Negative. Negative for chest pain, palpitations and leg swelling. Gastrointestinal: Negative. Negative for abdominal pain, blood in stool, constipation, diarrhea, nausea and vomiting. Endocrine: Negative for polydipsia. Genitourinary: Negative for dysuria, flank pain, frequency, hematuria and urgency. Musculoskeletal: Negative. Negative for back pain, myalgias and neck pain. Skin: Negative. Negative for rash. Neurological: Negative. Negative for dizziness, tremors, seizures, weakness and headaches. Hematological: Does not bruise/bleed easily. Psychiatric/Behavioral: Negative. The patient is not nervous/anxious. Objective   PHYSICAL EXAM:  Physical Exam  Vitals signs reviewed. Constitutional:       General: He is not in acute distress. Appearance: He is well-developed. He is not diaphoretic. HENT:      Head: Normocephalic and atraumatic. Mouth/Throat:      Pharynx: Uvula midline. Tonsils: No tonsillar exudate. Eyes:      General: Lids are normal. No scleral icterus. Right eye: No discharge. Left eye: No discharge. Conjunctiva/sclera: Conjunctivae normal.      Pupils: Pupils are equal, round, and reactive to light. Neck:      Musculoskeletal: Normal range of motion and neck supple. Thyroid: No thyroid mass or thyromegaly. Vascular: No JVD. Trachea: Trachea normal. No tracheal deviation. Cardiovascular:      Rate and Rhythm: Normal rate and regular rhythm. Heart sounds: Normal heart sounds. No murmur. No friction rub. No gallop. Pulmonary:      Effort: Pulmonary effort is normal. No respiratory distress. Breath sounds: Normal breath sounds. No wheezing or rales. Chest:      Chest wall: No tenderness. Abdominal:      General: Bowel sounds are normal. There is no distension. Palpations: Abdomen is soft. There is no mass. Tenderness: There is no abdominal tenderness. There is no guarding or rebound. Hernia: No hernia is present. Musculoskeletal:         General: No tenderness or deformity. Comments: Range of motion within normal limits x4 extremities   Skin:     General: Skin is warm. Coloration: Skin is not pale. Findings: No erythema or rash. Neurological:      Mental Status: He is alert and oriented to person, place, and time. Cranial Nerves: No cranial nerve deficit. Coordination: Coordination normal.   Psychiatric:         Behavior: Behavior normal.         Thought Content:  Thought content normal.         Labs:   Lab Results   Component Value Date    WBC 10.99 (H) 02/05/2021    HGB 16.5 02/05/2021    HCT 46.3 02/05/2021    MCV 86.4 02/05/2021     02/05/2021     Lab Results   Component Value Date    NEUTROABS 7.29 (H) 02/05/2021       ASSESSMENT/PLAN: 1. Colon cancer metastasized to lung [facility]), stage IIIb November 2016. Right lower lobe lung nodule December 2017, status post lobectomy. CT scan of the abdomen and pelvis on 4/12/2019 was without evidence of recurrent disease. Colonoscopy on 2/21/2020 was without evidence of disease and a 2-year recall has been requested by Dr. Erin Mahan. CT scan of the chest on 7/2/2020 also documented no evidence of recurrent disease. Continues to deny any GI or respiratory complaints. CEA of 6.5 was documented on 7/6/2020 and is chronically elevated, suspect elevation is secondary to tobacco use. [place] will continue to be monitored based upon NCCN surveillance guidelines for colon cancer:  After 2 years, history and physicaln every 6 months for 5 years. CT scan every 6-12 months ×5 years. - CBC and CMP today   -Schedule CT scan of the chest for July 2021    2. Chronically elevated CEA, suspect secondary to tobacco use. CEA of 6.8 in March 2017, CEA of 6.7 on 5/10/2019 and CEA of 6.5 on 7/6/2020  - CEA today and at every 6-month follow-up visit    3. History of pulmonary embolism, completed 11 months of anticoagulation. No recurrent PE. Is asymptomatic denying any shortness of breath or chest pain. 4.  Tobacco abuse counseling, continues to smoke 1 pack of cigarettes daily   We talked about the importance of quitting smoking for approximately 4-5 minutes. Specifically, we discussed the risk related tobacco including but not limited to carcinoma, cardiovascular disease, stroke, and financial loss. I advised to quit smoking and offered resources to include nicotine patches to help with the craving. The patient is contemplated at this time. I will follow-up on smoking cessation during the next visit and continue to encourage quitting tobacco use. I discussed all of the above findings included in the assessment and plan with the patient and the patient is in agreement to move forward with current recommendations/treatment. I have addressed all of their questions and concerns that were verbalized. FOLLOW UP:  1. Follow-up appointment given for 6 months, sooner if needed  2. Follow-up with other medical providers as recommended    Discussed precautions related to [address] and being at increased risk. Discussed proper handwashing to be done frequently, limit exposure to other individuals and maintain social distancing of 6 feet. Recommend contacting primary care provider if having respiratory symptoms for further recommendations and consideration for testing.     (Please note that portions of this note were completed with a voice recognition program. Efforts were made to edit the dictations but occasionally words are mis-transcribed,  Also, portions of this note have been copied forward, however, changed to reflect the most current clinical status of this patient.)    Electronically signed by JACLYN Johnson on 2/15/2021 at 6:15 PM

## 2021-11-05 DIAGNOSIS — C78.00 COLON CANCER METASTASIZED TO LUNG (HCC): Primary | ICD-10-CM

## 2021-11-05 DIAGNOSIS — C18.9 COLON CANCER METASTASIZED TO LUNG (HCC): Primary | ICD-10-CM

## 2022-02-01 DIAGNOSIS — C18.9 COLON CANCER METASTASIZED TO LUNG (HCC): Primary | ICD-10-CM

## 2022-02-01 DIAGNOSIS — C78.00 COLON CANCER METASTASIZED TO LUNG (HCC): Primary | ICD-10-CM

## 2022-02-04 ENCOUNTER — HOSPITAL ENCOUNTER (OUTPATIENT)
Dept: INFUSION THERAPY | Age: 57
End: 2022-02-04

## 2022-02-24 ASSESSMENT — ENCOUNTER SYMPTOMS
WHEEZING: 0
GASTROINTESTINAL NEGATIVE: 1
EYES NEGATIVE: 1
ABDOMINAL PAIN: 0
COUGH: 0
BACK PAIN: 0
NAUSEA: 0
EYE PAIN: 0
DIARRHEA: 0
RESPIRATORY NEGATIVE: 1
SORE THROAT: 0
EYE DISCHARGE: 0
CONSTIPATION: 0
SHORTNESS OF BREATH: 0
VOMITING: 0
BLOOD IN STOOL: 0
EYE REDNESS: 0

## 2022-02-24 NOTE — PROGRESS NOTES
Progress Note      Pt Name: Luz Hernandez  YOB: 1965  MRN: [id number]    Date of evaluation: 02/25/2022  History Obtained From:  patient, electronic medical record    CHIEF COMPLAINT:    Chief Complaint   Patient presents with    Follow-up     Colon cancer metastasized to lung ([address]    Cancer     Malignant melanoma    Other     History of lung nodule     HISTORY OF PRESENT ILLNESS:    Luz Hernandez is a 64 y.o.  male with significant PMH of rectal carcinoma, stage IIIb diagnosed in November 2016,  metastatic disease to the right lung in August 2017 and now with new diagnosis of stage 0 in situ malignant melanoma of the nose 1/28/2022. Jax Camara has had no known further progression of disease with last CT scan of chest on 11/19/2021 and is due for his 2-year follow-up colonoscopy in May. He presents today with no new complaints indicating overall he is doing well. Shyanne Dobson specifically denies any GI complaints/bowel habit changes, respiratory complaints or new skin lesions. Jax Camara has been very reluctant to continue with routine surveillance as recommended by NCCN guidelines. He voices frustration of the need for continued radiographic surveillance. He is in agreement to continue with annual low-dose CTs screenings of the chest.    NCCN surveillance guidelines for colon cancer:  After 2 years, history and physical and CEA every 6 months for 5 years. CT scan of the chest abdomen and pelvis every 6-12 months ×5 years.         ONCOLOGIC HISTORY:   Diagnosis   Stage IIIB colon cancer , November 2017   Recurrence single nodule RLL, August 2017   Pulmonary emboli in the RLL artery, 1/22/2018  stage 0 in situ Malignant Melanoma of left lower nasal and left lateral nasal, 1/28/2022    Treatment summary  January 2017- He declined Adjuvant therapy   RLL lobectomy December 2017   Eliquis initiated on 2/13/2018-January 2019    Cancer history- colorectal stage IIIB node positive  The patient is a pleasant 46years old male first seen by me on 1/3/2017 referred by Dr. Marie Noel for a diagnosis of stage IIIB colorectal adenocarcinoma status post laparoscopic assisted low anterior resection with primary anastomosis. 11/2/2016-he was seen by gastroenterologist due to a history of change in her bowel habits for the last 6 months and blood in the stools x one time. Father had a history of colon polyps. 11/18/2016- he underwent a colonoscopy with Dr. Kenny Meza with the findings of a stenosing lesion at 15 cm from the anal verge. In addition, he was found to have several other polyps consistent with tubular adenoma high-grade. 11/30/2016- the patient underwent a low anterior resection laparoscopic-assisted by Dr. Marie Noel at [facility] for a invasive moderate to poorly differentiated adenocarcinoma extending into subserosa adipose tissue with 4 out of 15 lymph nodes involved by metastatic disease. Margins were clear. Radial margin free of malignancy. No perineural invasion. No lymphovascular invasion. Final pathologic staging [id number] stage IIIB   December 8777 -Complicated postoperative course with an anastomotic leak and perirectal inflammation/infection. This is now resolving. 12/15/2016-CT scan of abdomen and pelvis revealed no focal liver or pancreas lesion. Mild stranding at the level of previous surgery as well as some presacral thickening. A small fluid/air collection within the presacral soft tissue measuring 2.4 cm in size, which contains some air. No significant lymphadenopathy noted. 1/3/2017-referred for discussion of adjuvant chemoradiation, declined treatment at present time. He was contemplative regarding the side effects and a small absolute benefit of 6% from the chemotherapy. He was explained in detail about the risk of local recurrence and distant metastasis in verbalized an understanding but he denied adjuvant chemotherapy or radiation at that time.  Concerns and risk were thoroughly discussed with [facility]. 1/9/2017- CT scan of chest revealed no obvious adrenal pathology or significant adenopathy appreciated. COPD with no obvious lung nodularity other than occasional calcified granuloma. 3/24/2017-declined systemic adjuvant chemotherapy or RT.   08/04/2017- Surveillance CT scan of the abdomen was performed for a raising CEA at 6.5. The abdomen and pelvis was unremarkable but a 19 x 10 mm nodule was visualized in the right lower lobe.   08/11/2017- CT scan of the chest showed a 1.7 cm right lower lobe nodule abutting the medial pleura. Additional 6 mm nodule in the right middle lobe. 8/17/2017- PET CT scan showed a right lower lobe nodule measuring 1.8 cm (SUV 9.3). No abnormal lymph node activity within the mediastinum or hilar regions. The small right middle lobe pulmonary nodule does not demonstrate an increased activity. However this nodule measures only 6 mm in size and could be below the limits of resolution. Otherwise no other evidence of metastatic disease. He was   9/15/2017- the patient was seen by me for discussion of his scans. He has also been seen by Dr. Kika Norris was plans for surgery (lobectomy) in early November 2017.   12/6/2017- Right lower lobectomy by Dr Jony Medina. Consistent with metastatic colonic adenocarcinoma. 5 mediastinal lymph nodes negative. 1/22/2018- 1Year recall colonoscopy by Dr. Alfie Perez documented a total of 6 polyps removed. Pathology documented all 6 polyps as having no evidence of malignancy. 1/22/2018- CT scan of the abdomen and pelvis documented a tiny nodule in the right adrenal gland that measures less than 1 cm in size and likely represents an adenoma, this has been present since 12/15/2016. No mass, lymphadenopathy or fluid collection is noted. The osseous structures did not demonstrate any evidence of bone metastasis. 2/22/2018- CT scan of the chest documented moderate emphysematous changes again noted in bilateral lungs. The previously noted nodule in the RLL has been resected. A small complex pleural fluid collection is in the posterior medial right lower thorax. Previously measured right hilar lymph node now measures 8 mm, previously measuring 1.5 cm. A pulmonary embolus in the stump of the right lower lobar artery. 5/25/2018 - CT scan of the abdomen and pelvis documented no evidence of locally recurrent metastatic disease. 5/25/2018- CT scan of the chest documented no significant interval change from previous exam. Embolus stump in the pulmonary artery to the right lower lobe redemonstrated. No new enlarged axillary, hilar or mediastinal lymphadenopathy. Right hilar lymph node measures 9 mm with no change from previous exam.   12/7/2018- CT scan of abdomen and pelvis with contrast documented no evidence metastatic disease in the abdomen or pelvis. 12/7/2018- CT scan of the chest with contrast documented the pulmonary arteries being within normal limits. Mediastinal and hilar lymph nodes are stable since previous study. There is no interval enlargement or change. No evidence of recurrent or metastatic disease in the chest   4/12/2019- CT scan of the abdomen and pelvis with contrast documented no evidence of neoplastic/metastatic disease. No acute intra-abdominal process. 4/12/2019- CT scan of the chest with contrast documented right paratracheal node measuring 7 mm AP window node measuring 7 mm, right hilar node measuring 13 mm and left hilar node measuring 11 mm, all stable with no change compared to CT scan on 1/22/2018. No new developing mediastinal or hilar lymphadenopathy. Pulmonary emphysema identified. 2/21/2020-colonoscopy by Dr. Migdalia Wilcox reports that his colonoscopy was without concerning findings and he has a recall colonoscopy in February 2023. Unable to view results from colonoscopy in care everywhere, will request a copy of the colonoscopy results.   7/2/2020 - CT scan of the chest with contrast was stable with no radiographic evidence suggesting recurrent disease. The known borderline enlarged lymph nodes in the right peritracheal and mid right hilar areas are unchanged compared to CT scan of the chest on 4/12/2019. Advanced diffuse emphysema is noted. 11/19/2021 CT chest with contrast [facility])- Stable chest CT, no convincing evidence of intrathoracic metastatic disease. Mild right hilar lymphadenopathy is unchanged, a single slightly enlarged aorticopulmonary window lymph node is noted. Evidence of previous right lower lobectomy with post treatment changes. Extensive centrilobular emphysema, greatest in the right upper lobe. Calcified plaque is noted within the LAD coronary artery distribution. Stable 14 mm probable adenoma in the left adrenal gland. Cancer History-Malignant Melanoma of left lower nasal and left lateral nasal    Dr. Lonnie Pulliam Case completed biopsy that revealed melanoma 0.2 mm without ulceration of the left nasal ala.    01/18/2022 ([place])-Dr. Chidi Lucas performed Mohs resection of skin cancer on nose, margins were clear, complex excision with acquired facial deficit. Pathology of the left nasal ala revealed malignant melanoma in situ with margins involved and additional 4 skin biopsies were negative.        Past Medical History:    Past Medical History:   Diagnosis Date    Colon cancer ([address]     surgery only    COPD (chronic obstructive pulmonary disease) ([address]     Hyperlipidemia     Upper respiratory infection     recent tx with antibiotics and prednisone       Past Surgical History:    Past Surgical History:   Procedure Laterality Date    CARDIAC CATHETERIZATION      COLECTOMY      [facility]GERY      at [facility]    LOBECTOMY Right 12/6/2017    RIGHT THORACOTOMY; RIGHT LOWER  LOBECTOMY performed by Enrique Park MD at . [place] [id number]         Current Medications:    Current Outpatient Medications   Medication Sig Dispense Refill    acetaminophen (TYLENOL) 325 MG tablet Take 650 mg by mouth       No current facility-administered medications for this visit. Allergies: No Known Allergies    Social History:    Social History     Tobacco Use    Smoking status: Former Smoker     Packs/day: 2.00     Years: 25.00     Pack years: 50.00     Types: Cigarettes    Smokeless tobacco: Never Used    Tobacco comment: quit recently   Substance Use Topics    Alcohol use: Yes     Alcohol/week: 12.0 standard drinks     Types: 12 Cans of beer per week     Comment: avg per week/mostly fri and sat nights    Drug use: No       Family History:   Family History   Problem Relation Age of Onset    Other Mother         hx. benign lung nodule    Other Father         nasra damon    Colon Polyps Father        Vitals:  Vitals:    02/25/22 1258   BP: (!) 168/100   Pulse: 83   SpO2: 97%   Weight: 186 lb (84.4 kg)   Height: 5' 10\" (1.778 m)        Subjective   REVIEW OF SYSTEMS:   Review of Systems   Constitutional: Negative. Negative for chills, diaphoresis and fever. HENT: Negative. Negative for congestion, ear pain, hearing loss, nosebleeds, sore throat and tinnitus. Eyes: Negative. Negative for pain, discharge and redness. Respiratory: Negative. Negative for cough, shortness of breath and wheezing. Cardiovascular: Negative. Negative for chest pain, palpitations and leg swelling. Gastrointestinal: Negative. Negative for abdominal pain, blood in stool, constipation, diarrhea, nausea and vomiting. Endocrine: Negative for polydipsia. Genitourinary: Negative for dysuria, flank pain, frequency, hematuria and urgency. Musculoskeletal: Negative. Negative for back pain, myalgias and neck pain. Skin: Negative. Negative for rash. Neurological: Negative. Negative for dizziness, tremors, seizures, weakness and headaches. Hematological: Does not bruise/bleed easily. Psychiatric/Behavioral: Negative. The patient is not nervous/anxious.         Objective   PHYSICAL EXAM:  Physical Exam  Vitals reviewed. Constitutional:       General: He is not in acute distress. Appearance: He is well-developed. HENT:      Head: Normocephalic and atraumatic. Nose:      Comments: Surgical scar well-healed to left nose     Mouth/Throat:      Pharynx: Uvula midline. Tonsils: No tonsillar exudate. Eyes:      General: Lids are normal.      Conjunctiva/sclera: Conjunctivae normal.      Pupils: Pupils are equal, round, and reactive to light. Neck:      Thyroid: No thyroid mass or thyromegaly. Vascular: No JVD. Trachea: Trachea normal. No tracheal deviation. Cardiovascular:      Rate and Rhythm: Normal rate and regular rhythm. Pulses: Normal pulses. Heart sounds: Normal heart sounds. Pulmonary:      Effort: Pulmonary effort is normal. No respiratory distress. Breath sounds: Normal breath sounds. No wheezing or rales. Chest:      Chest wall: No tenderness. Abdominal:      General: Bowel sounds are normal. There is no distension. Palpations: Abdomen is soft. There is no mass. Tenderness: There is no abdominal tenderness. There is no guarding. Musculoskeletal:         General: No tenderness or deformity. Cervical back: Normal range of motion and neck supple. Comments: Range of motion within normal limits x4 extremities   Skin:     General: Skin is warm. Findings: No bruising, erythema or rash. Neurological:      Mental Status: He is alert and oriented to person, place, and time. Cranial Nerves: No cranial nerve deficit. Coordination: Coordination normal.   Psychiatric:         Behavior: Behavior normal.         Thought Content:  Thought content normal.         Labs:   Lab Results   Component Value Date    WBC 9.64 (H) 02/25/2022    HGB 17.3 02/25/2022    HCT 49.1 02/25/2022    MCV 86.0 02/25/2022     02/25/2022     Lab Results   Component Value Date    NEUTROABS 6.60 (H) 02/25/2022     Lab Results Component Value Date    CEA 7.2 (H) 02/05/2021     ASSESSMENT/PLAN:      1. Colon cancer metastasized to lung [facility]), stage IIIb November 2016. Right lower lobe lung nodule December 2017, status post lobectomy. No known evidence of recurrent disease. Denies any GI complaints or bowel habit changes. Margie Llamas has been very reluctant to continue with routine surveillance as recommended by NCCN guidelines. He voices frustration of the need for continued radiographic surveillance. He is in agreement to continue with annual low-dose CTs screenings of the chest.    11/19/2021 CT chest with contrast [facility])- Stable chest CT, no convincing evidence of intrathoracic metastatic disease. Mild right hilar lymphadenopathy is unchanged, a single slightly enlarged aorticopulmonary window lymph node is noted. Evidence of previous right lower lobectomy with post treatment changes. Extensive centrilobular emphysema, greatest in the right upper lobe. Calcified plaque is noted within the LAD coronary artery distribution. Stable 14 mm probable adenoma in the left adrenal gland. -CBC, CEA and CMP today   -Keep scheduled appointment for follow-up colonoscopy in May 22  -Schedule CT low-dose CT scan of the chest in November 2022    2. Chronically elevated CEA, suspect secondary to tobacco use. CEA of 6.8 in March 2017, CEA of 6.7 on 5/10/2019, CEA of 6.5 on 7/6/2020 and CEA of 7.2 on 2/5/2021.    - CEA today and at every 6-month follow-up visit    3. History of pulmonary embolism, completed 11 months of anticoagulation. No recurrent PE. Is asymptomatic denying any shortness of breath or chest pain. 4.  Tobacco abuse counseling, continues to smoke 1 pack of cigarettes daily   We talked about the importance of quitting smoking for approximately 4-5 minutes. Specifically, we discussed the risk related tobacco including but not limited to carcinoma, cardiovascular disease, stroke, and financial loss.  I advised to quit smoking and offered resources to include nicotine patches to help with the craving. The patient is contemplated at this time. I will follow-up on smoking cessation during the next visit and continue to encourage quitting tobacco use. 5. 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    -Follow-up with Dr. Price as recommended, every 3 months    I discussed all of the above findings included in the assessment and plan with the patient and the patient is in agreement to move forward with current recommendations/treatment. I have addressed all of their questions and concerns that were verbalized. FOLLOW UP:  1. Follow-up appointment given for December, 2022, sooner if needed  2. Follow-up with other medical providers as recommended  3. Labs at next visit:     Discussed precautions related to [address] and being at increased risk. Discussed proper handwashing to be done frequently, limit exposure to other individuals and maintain social distancing of 6 feet. Recommend contacting primary care provider if having respiratory symptoms for further recommendations and consideration for testing. (Please note that portions of this note were completed with a voice recognition program. Efforts were made to edit the dictations but occasionally words are mis-transcribed,  Also, portions of this note have been copied forward, however, changed to reflect the most current clinical status of this patient.)    Electronically signed by JACLYN Teixeira on 2/25/2022 at 4:08 PM  William SHEEHAN am pre-charting as a registered nurse for JACLYN Araujo.

## 2022-02-25 ENCOUNTER — HOSPITAL ENCOUNTER (OUTPATIENT)
Dept: INFUSION THERAPY | Age: 57
Discharge: HOME | End: 2022-02-25
Payer: COMMERCIAL

## 2022-02-25 ENCOUNTER — OFFICE VISIT (OUTPATIENT)
Dept: HEMATOLOGY | Age: 57
End: 2022-02-25
Payer: COMMERCIAL

## 2022-02-25 VITALS
SYSTOLIC BLOOD PRESSURE: 168 MMHG | DIASTOLIC BLOOD PRESSURE: 100 MMHG | WEIGHT: 186 LBS | HEART RATE: 83 BPM | BODY MASS INDEX: 26.63 KG/M2 | HEIGHT: 70 IN | OXYGEN SATURATION: 97 %

## 2022-02-25 DIAGNOSIS — C78.00 COLON CANCER METASTASIZED TO LUNG (HCC): ICD-10-CM

## 2022-02-25 DIAGNOSIS — Z71.6 TOBACCO ABUSE COUNSELING: ICD-10-CM

## 2022-02-25 DIAGNOSIS — C18.9 COLON CANCER METASTASIZED TO LUNG (HCC): ICD-10-CM

## 2022-02-25 DIAGNOSIS — R97.0 ELEVATED CEA: ICD-10-CM

## 2022-02-25 DIAGNOSIS — Z86.711 HISTORY OF PULMONARY EMBOLISM: ICD-10-CM

## 2022-02-25 DIAGNOSIS — F17.200 SMOKER: ICD-10-CM

## 2022-02-25 DIAGNOSIS — Z85.038 HISTORY OF COLON CANCER, STAGE III: Primary | ICD-10-CM

## 2022-02-25 LAB
ALBUMIN SERPL-MCNC: 4 G/DL (ref 3.5–5.2)
ALP BLD-CCNC: 103 U/L (ref 40–130)
ALT SERPL-CCNC: 18 U/L (ref 21–72)
ANION GAP SERPL CALCULATED.3IONS-SCNC: 7 MMOL/L (ref 7–19)
AST SERPL-CCNC: 22 U/L (ref 17–59)
BASOPHILS ABSOLUTE: 0.03 K/UL (ref 0.01–0.08)
BASOPHILS RELATIVE PERCENT: 0.3 % (ref 0.1–1.2)
BILIRUB SERPL-MCNC: 0.5 MG/DL (ref 0.2–1.3)
BUN BLDV-MCNC: 11 MG/DL (ref 9–20)
CALCIUM SERPL-MCNC: 9.6 MG/DL (ref 8.4–10.2)
CEA: 7 NG/ML (ref 0–4.7)
CHLORIDE BLD-SCNC: 106 MMOL/L (ref 98–111)
CO2: 29 MMOL/L (ref 22–29)
CREAT SERPL-MCNC: 0.8 MG/DL (ref 0.6–1.2)
EOSINOPHILS ABSOLUTE: 0.16 K/UL (ref 0.04–0.54)
EOSINOPHILS RELATIVE PERCENT: 1.7 % (ref 0.7–7)
GFR NON-AFRICAN AMERICAN: >60
GLUCOSE BLD-MCNC: 97 MG/DL (ref 74–106)
HCT VFR BLD CALC: 49.1 % (ref 40.1–51)
HEMOGLOBIN: 17.3 G/DL (ref 13.7–17.5)
LYMPHOCYTES ABSOLUTE: 2.29 K/UL (ref 1.18–3.74)
LYMPHOCYTES RELATIVE PERCENT: 23.8 % (ref 19.3–53.1)
MCH RBC QN AUTO: 30.3 PG (ref 25.7–32.2)
MCHC RBC AUTO-ENTMCNC: 35.2 G/DL (ref 32.3–36.5)
MCV RBC AUTO: 86 FL (ref 79–92.2)
MONOCYTES ABSOLUTE: 0.56 K/UL (ref 0.24–0.82)
MONOCYTES RELATIVE PERCENT: 5.8 % (ref 4.7–12.5)
NEUTROPHILS ABSOLUTE: 6.6 K/UL (ref 1.56–6.13)
NEUTROPHILS RELATIVE PERCENT: 68.4 % (ref 34–71.1)
PDW BLD-RTO: 13.9 % (ref 11.6–14.4)
PLATELET # BLD: 300 K/UL (ref 163–337)
PMV BLD AUTO: 9 FL (ref 7.4–10.4)
POTASSIUM SERPL-SCNC: 4.5 MMOL/L (ref 3.5–5.1)
RBC # BLD: 5.71 M/UL (ref 4.63–6.08)
SODIUM BLD-SCNC: 142 MMOL/L (ref 137–145)
TOTAL PROTEIN: 6.3 G/DL (ref 6.3–8.2)
WBC # BLD: 9.64 K/UL (ref 4.23–9.07)

## 2022-02-25 PROCEDURE — 99213 OFFICE O/P EST LOW 20 MIN: CPT | Performed by: NURSE PRACTITIONER

## 2022-02-25 PROCEDURE — 36415 COLL VENOUS BLD VENIPUNCTURE: CPT

## 2022-02-25 PROCEDURE — 85025 COMPLETE CBC W/AUTO DIFF WBC: CPT

## 2022-02-25 PROCEDURE — 99211 OFF/OP EST MAY X REQ PHY/QHP: CPT

## 2022-02-25 PROCEDURE — 80053 COMPREHEN METABOLIC PANEL: CPT

## 2022-09-24 ENCOUNTER — OFFICE VISIT (OUTPATIENT)
Age: 57
End: 2022-09-24
Payer: COMMERCIAL

## 2022-09-24 ENCOUNTER — HOSPITAL ENCOUNTER (OUTPATIENT)
Dept: GENERAL RADIOLOGY | Age: 57
Discharge: HOME | End: 2022-09-24
Payer: COMMERCIAL

## 2022-09-24 VITALS
RESPIRATION RATE: 17 BRPM | SYSTOLIC BLOOD PRESSURE: 138 MMHG | TEMPERATURE: 98.4 F | HEIGHT: 70 IN | DIASTOLIC BLOOD PRESSURE: 78 MMHG | BODY MASS INDEX: 25.91 KG/M2 | OXYGEN SATURATION: 97 % | WEIGHT: 181 LBS | HEART RATE: 88 BPM

## 2022-09-24 DIAGNOSIS — R06.2 WHEEZING: ICD-10-CM

## 2022-09-24 DIAGNOSIS — Z11.52 ENCOUNTER FOR SCREENING FOR COVID-19: ICD-10-CM

## 2022-09-24 DIAGNOSIS — J18.9 PNEUMONIA DUE TO INFECTIOUS ORGANISM, UNSPECIFIED LATERALITY, UNSPECIFIED PART OF LUNG: Primary | ICD-10-CM

## 2022-09-24 LAB — SARS-COV-2, PCR: NOT DETECTED

## 2022-09-24 PROCEDURE — 96372 THER/PROPH/DIAG INJ SC/IM: CPT | Performed by: NURSE PRACTITIONER

## 2022-09-24 PROCEDURE — 71046 X-RAY EXAM CHEST 2 VIEWS: CPT | Performed by: RADIOLOGY

## 2022-09-24 PROCEDURE — 71046 X-RAY EXAM CHEST 2 VIEWS: CPT

## 2022-09-24 PROCEDURE — 99214 OFFICE O/P EST MOD 30 MIN: CPT | Performed by: NURSE PRACTITIONER

## 2022-09-24 RX ORDER — METHYLPREDNISOLONE 4 MG/1
TABLET ORAL
Qty: 1 KIT | Refills: 0 | Status: SHIPPED | OUTPATIENT
Start: 2022-09-24 | End: 2022-09-30

## 2022-09-24 RX ORDER — DEXAMETHASONE SODIUM PHOSPHATE 10 MG/ML
10 INJECTION INTRAMUSCULAR; INTRAVENOUS ONCE
Status: COMPLETED | OUTPATIENT
Start: 2022-09-24 | End: 2022-09-24

## 2022-09-24 RX ORDER — AZITHROMYCIN 250 MG/1
250 TABLET, FILM COATED ORAL SEE ADMIN INSTRUCTIONS
Qty: 6 TABLET | Refills: 0 | Status: SHIPPED | OUTPATIENT
Start: 2022-09-24 | End: 2022-09-29

## 2022-09-24 RX ORDER — AMOXICILLIN AND CLAVULANATE POTASSIUM 875; 125 MG/1; MG/1
1 TABLET, FILM COATED ORAL 2 TIMES DAILY
Qty: 20 TABLET | Refills: 0 | Status: SHIPPED | OUTPATIENT
Start: 2022-09-24 | End: 2022-10-04

## 2022-09-24 RX ADMIN — DEXAMETHASONE SODIUM PHOSPHATE 10 MG: 10 INJECTION INTRAMUSCULAR; INTRAVENOUS at 11:02

## 2022-09-24 ASSESSMENT — ENCOUNTER SYMPTOMS
EYE DISCHARGE: 0
ABDOMINAL DISTENTION: 0
SORE THROAT: 0
WHEEZING: 0
SHORTNESS OF BREATH: 1
CHEST TIGHTNESS: 0
STRIDOR: 0
EYE PAIN: 0
SINUS PRESSURE: 0
TROUBLE SWALLOWING: 0
COLOR CHANGE: 0
COUGH: 1
ABDOMINAL PAIN: 0

## 2022-09-24 NOTE — PATIENT INSTRUCTIONS
CXR showed pneumonia  Sending augmentin and azith. Dex IM given in office, start medrol pack tomorrow. Monitor BP  If severe shortness of breath or chest pain occurs go to ER immediately. If symptoms fail to improve follow-up with PCP.     Patient verbalized understanding and agrees to treatment plan

## 2022-09-24 NOTE — PROGRESS NOTES
[address]  [address] [address] Zandra [place] [address]  Dept: [phone]  Dept Fax: Kaitlynn Walton: [phone]    Carmen Weiss is a 62 y.o. male who presents today for his medical conditions/complaints as noted below. Carmen Weiss is complaining of Congestion (Head and chest congestion since about last Monday ), Cough, and Shortness of Breath        HPI:   Cough  Associated symptoms include shortness of breath. Pertinent negatives include no chest pain, chills, fever, rash, sore throat or wheezing. Shortness of Breath  Pertinent negatives include no abdominal pain, chest pain, fever, neck pain, rash, sore throat or wheezing. [address] office complaining of cough, shortness of breath, wheezing, and head congestion. He states symptoms started Monday. Patient denies any fever. Patient states that he is at high risk for pneumonia because he has had a lobe removed from his lung due to cancer. Patient does smoke.   Past Medical History:   Diagnosis Date    Colon cancer ([facility] [address]     surgery only    COPD (chronic obstructive pulmonary disease) ([facility] [address]     Hyperlipidemia     Upper respiratory infection     recent tx with antibiotics and prednisone       Past Surgical History:   Procedure Laterality Date    CARDIAC CATHETERIZATION      COLECTOMY      COLON SURGERY      at [facility]    LOBECTOMY Right 2017    RIGHT THORACOTOMY; RIGHT LOWER  LOBECTOMY performed by Juan Peterson MD at [facility]         Family History   Problem Relation Age of Onset    Other Mother         hx. benign lung nodule    Other Father         nasra damon    Colon Polyps Father        Social History     Tobacco Use    Smoking status: Former     Packs/day: 2.00     Years: 25.00     Pack years: 50.00     Types: Cigarettes     Quit date: 2021     Years since quittin.2    Smokeless tobacco: Never    Tobacco comments:     quit recently   Substance Use Topics    Alcohol use: Yes     Alcohol/week: 12.0 standard drinks     Types: 12 Cans of beer per week     Comment: avg per week/mostly fri and sat nights        Current Outpatient Medications   Medication Sig Dispense Refill    methylPREDNISolone (MEDROL DOSEPACK) 4 MG tablet Take by mouth. 1 kit 0    amoxicillin-clavulanate (AUGMENTIN) 875-125 MG per tablet Take 1 tablet by mouth 2 times daily for 10 days 20 tablet 0    azithromycin (ZITHROMAX) 250 MG tablet Take 1 tablet by mouth See Admin Instructions for 5 days 500mg on day 1 followed by 250mg on days 2 - 5 6 tablet 0    acetaminophen (TYLENOL) 325 MG tablet Take 650 mg by mouth       No current facility-administered medications for this visit. No Known Allergies    Health Maintenance   Topic Date Due    Hepatitis B vaccine (1 of 3 - 3-dose series) Never done    COVID-19 Vaccine (1) Never done    Pneumococcal 0-64 years Vaccine (1 - PCV) Never done    Depression Screen  Never done    Shingles vaccine (1 of 2) Never done    Flu vaccine (1) Never done    DTaP/Tdap/Td vaccine (2 - Td or Tdap) 05/26/2027    Hepatitis A vaccine  Aged Out    Hib vaccine  Aged Out    Meningococcal (ACWY) vaccine  Aged Out       Subjective:   Review of Systems   Constitutional:  Negative for chills, fatigue and fever. HENT:  Positive for congestion. Negative for sinus pressure, sore throat and trouble swallowing. Eyes:  Negative for pain and discharge. Respiratory:  Positive for cough and shortness of breath. Negative for chest tightness, wheezing and stridor. Cardiovascular:  Negative for chest pain and palpitations. Gastrointestinal:  Negative for abdominal distention and abdominal pain. Genitourinary:  Negative for difficulty urinating, dysuria and hematuria. Musculoskeletal:  Negative for arthralgias, neck pain and neck stiffness. Skin:  Negative for color change and rash. Neurological:  Negative for dizziness, syncope, speech difficulty, weakness and numbness. Psychiatric/Behavioral:  Negative for confusion and suicidal ideas. Objective    Physical Exam  Vitals and nursing note reviewed. Constitutional:       General: He is not in acute distress. Appearance: Normal appearance. HENT:      Head: Normocephalic. Right Ear: Tympanic membrane, ear canal and external ear normal.      Left Ear: Tympanic membrane, ear canal and external ear normal.      Nose: No congestion or rhinorrhea. Mouth/Throat:      Mouth: Mucous membranes are moist.      Pharynx: Oropharynx is clear. No posterior oropharyngeal erythema. Eyes:      Conjunctiva/sclera: Conjunctivae normal.      Pupils: Pupils are equal, round, and reactive to light. Cardiovascular:      Rate and Rhythm: Normal rate and regular rhythm. Pulses: Normal pulses. Heart sounds: Normal heart sounds. No murmur heard. Pulmonary:      Effort: Pulmonary effort is normal. No respiratory distress. Breath sounds: No stridor. Wheezing (loud wheezing throughout all lung fields.) present. Abdominal:      General: Abdomen is flat. Bowel sounds are normal. There is no distension. Tenderness: There is no abdominal tenderness. Musculoskeletal:         General: No swelling or deformity. Normal range of motion. Cervical back: Normal range of motion. No rigidity or tenderness. Skin:     General: Skin is warm and dry. Findings: No rash. Neurological:      General: No focal deficit present. Mental Status: He is alert and oriented to person, place, and time. Sensory: No sensory deficit. /78   Pulse 88   Temp 98.4 °F (36.9 °C)   Resp 17   Ht 5' 10\" (1.778 m)   Wt 181 lb (82.1 kg)   SpO2 97%   BMI 25.97 kg/m²     Assessment         Diagnosis Orders   1. Pneumonia due to infectious organism, unspecified laterality, unspecified part of lung  amoxicillin-clavulanate (AUGMENTIN) 875-125 MG per tablet    azithromycin (ZITHROMAX) 250 MG tablet      2.  Encounter for screening for COVID-19  COVID-19      3. Wheezing  XR CHEST STANDARD (2 VW)    dexamethasone (DECADRON) injection 10 mg          Plan   CXR showed pneumonia  Sending augmentin and azith. Dex IM given in office, start medrol pack tomorrow. Monitor BP  If severe shortness of breath or chest pain occurs go to ER immediately. If symptoms fail to improve follow-up with PCP. Patient verbalized understanding and agrees to treatment plan    Orders Placed This Encounter   Procedures    XR CHEST STANDARD (2 VW)     Standing Status:   Future     Number of Occurrences:   1     Standing Expiration Date:   9/24/2023    COVID-19     Scheduling Instructions:      1) Due to current limited availability of the COVID-19 test, tests will be prioritized based on responses to questions above. Testing may be delayed due to volume. 2) Print and instruct patient to adhere to CDC home isolation program. (Link Above)              3) Set up or refer patient for a monitoring program.              4) Have patient sign up for and leverage MyChart (if not previously done). Order Specific Question:   Is this test for diagnosis or screening? Answer:   Screening     Order Specific Question:   Symptomatic for COVID-19 as defined by CDC? Answer:   No     Order Specific Question:   Date of Symptom Onset     Answer:   N/A     Order Specific Question:   Hospitalized for COVID-19? Answer:   No     Order Specific Question:   Admitted to ICU for COVID-19? Answer:   No     Order Specific Question:   Employed in healthcare setting? Answer:   Unknown     Order Specific Question:   Resident in a congregate (group) care setting? Answer:   Unknown     Order Specific Question:   Pregnant: Answer:   No     Order Specific Question:   Previously tested for COVID-19? Answer:   Unknown    COVID-19    COVID-19       No results found for this visit on 09/24/22.     Orders Placed This Encounter   Medications dexamethasone (DECADRON) injection 10 mg    methylPREDNISolone (MEDROL DOSEPACK) 4 MG tablet     Sig: Take by mouth. Dispense:  1 kit     Refill:  0    amoxicillin-clavulanate (AUGMENTIN) 875-125 MG per tablet     Sig: Take 1 tablet by mouth 2 times daily for 10 days     Dispense:  20 tablet     Refill:  0    azithromycin (ZITHROMAX) 250 MG tablet     Sig: Take 1 tablet by mouth See Admin Instructions for 5 days 500mg on day 1 followed by 250mg on days 2 - 5     Dispense:  6 tablet     Refill:  0      New Prescriptions    AMOXICILLIN-CLAVULANATE (AUGMENTIN) 875-125 MG PER TABLET    Take 1 tablet by mouth 2 times daily for 10 days    AZITHROMYCIN (ZITHROMAX) 250 MG TABLET    Take 1 tablet by mouth See Admin Instructions for 5 days 500mg on day 1 followed by 250mg on days 2 - 5    METHYLPREDNISOLONE (MEDROL DOSEPACK) 4 MG TABLET    Take by mouth. Return if symptoms worsen or fail to improve. Discussed use, benefits, and side effects of any prescribed medications. All patient questions were answered. Patient voiced understanding of care plan. Patient was given educational materials - see patient instructions below. Patient Instructions   CXR showed pneumonia  Sending augmentin and azith. Dex IM given in office, start medrol pack tomorrow. Monitor BP  If severe shortness of breath or chest pain occurs go to ER immediately. If symptoms fail to improve follow-up with PCP.     Patient verbalized understanding and agrees to treatment plan      Electronically signed by JACLYN Pearson CNP on 9/24/2022 at 11:57 AM

## 2022-12-02 ENCOUNTER — OFFICE VISIT (OUTPATIENT)
Dept: HEMATOLOGY | Age: 57
End: 2022-12-02
Payer: COMMERCIAL

## 2022-12-02 ENCOUNTER — HOSPITAL ENCOUNTER (OUTPATIENT)
Dept: INFUSION THERAPY | Age: 57
Discharge: HOME | End: 2022-12-02
Payer: COMMERCIAL

## 2022-12-02 VITALS
SYSTOLIC BLOOD PRESSURE: 100 MMHG | DIASTOLIC BLOOD PRESSURE: 62 MMHG | WEIGHT: 184 LBS | HEIGHT: 70 IN | BODY MASS INDEX: 26.34 KG/M2 | OXYGEN SATURATION: 95 % | HEART RATE: 100 BPM

## 2022-12-02 DIAGNOSIS — C43.31 MALIGNANT MELANOMA OF NOSE (HCC): ICD-10-CM

## 2022-12-02 DIAGNOSIS — C78.00 COLON CANCER METASTASIZED TO LUNG (HCC): Primary | ICD-10-CM

## 2022-12-02 DIAGNOSIS — F17.200 SMOKER: ICD-10-CM

## 2022-12-02 DIAGNOSIS — C18.9 COLON CANCER METASTASIZED TO LUNG (HCC): ICD-10-CM

## 2022-12-02 DIAGNOSIS — I10 PRIMARY HYPERTENSION: ICD-10-CM

## 2022-12-02 DIAGNOSIS — C19 COLORECTAL CANCER (HCC): ICD-10-CM

## 2022-12-02 DIAGNOSIS — C18.9 COLON CANCER METASTASIZED TO LUNG (HCC): Primary | ICD-10-CM

## 2022-12-02 DIAGNOSIS — C78.00 COLON CANCER METASTASIZED TO LUNG (HCC): ICD-10-CM

## 2022-12-02 DIAGNOSIS — Z86.711 HISTORY OF PULMONARY EMBOLISM: ICD-10-CM

## 2022-12-02 LAB
ALBUMIN SERPL-MCNC: 4.4 G/DL (ref 3.5–5.2)
ALP BLD-CCNC: 106 U/L (ref 40–130)
ALT SERPL-CCNC: 20 U/L (ref 5–41)
ANION GAP SERPL CALCULATED.3IONS-SCNC: 13 MMOL/L (ref 7–19)
AST SERPL-CCNC: 16 U/L (ref 5–40)
BASOPHILS ABSOLUTE: 0.08 K/UL (ref 0.01–0.08)
BASOPHILS RELATIVE PERCENT: 0.7 % (ref 0.1–1.2)
BILIRUB SERPL-MCNC: 0.3 MG/DL (ref 0.2–1.2)
BUN BLDV-MCNC: 12 MG/DL (ref 6–20)
CALCIUM SERPL-MCNC: 9.9 MG/DL (ref 8.6–10)
CEA: 6.5 NG/ML (ref 0–4.7)
CHLORIDE BLD-SCNC: 101 MMOL/L (ref 98–111)
CO2: 25 MMOL/L (ref 22–29)
CREAT SERPL-MCNC: 0.8 MG/DL (ref 0.5–1.2)
EOSINOPHILS ABSOLUTE: 0.1 K/UL (ref 0.04–0.54)
EOSINOPHILS RELATIVE PERCENT: 0.8 % (ref 0.7–7)
GFR SERPL CREATININE-BSD FRML MDRD: >60 ML/MIN/{1.73_M2}
GLUCOSE BLD-MCNC: 121 MG/DL (ref 74–109)
HCT VFR BLD CALC: 50.1 % (ref 40.1–51)
HEMOGLOBIN: 16.9 G/DL (ref 13.7–17.5)
LYMPHOCYTES ABSOLUTE: 2.56 K/UL (ref 1.18–3.74)
LYMPHOCYTES RELATIVE PERCENT: 21.3 % (ref 19.3–53.1)
MCH RBC QN AUTO: 29.7 PG (ref 25.7–32.2)
MCHC RBC AUTO-ENTMCNC: 33.7 G/DL (ref 32.3–36.5)
MCV RBC AUTO: 88 FL (ref 79–92.2)
MONOCYTES ABSOLUTE: 0.54 K/UL (ref 0.24–0.82)
MONOCYTES RELATIVE PERCENT: 4.5 % (ref 4.7–12.5)
NEUTROPHILS ABSOLUTE: 8.67 K/UL (ref 1.56–6.13)
NEUTROPHILS RELATIVE PERCENT: 72 % (ref 34–71.1)
PDW BLD-RTO: 14.5 % (ref 11.6–14.4)
PLATELET # BLD: 250 K/UL (ref 163–337)
PMV BLD AUTO: 10.3 FL (ref 7.4–10.4)
POTASSIUM SERPL-SCNC: 4.2 MMOL/L (ref 3.5–5)
RBC # BLD: 5.69 M/UL (ref 4.63–6.08)
SODIUM BLD-SCNC: 139 MMOL/L (ref 136–145)
TOTAL PROTEIN: 6.5 G/DL (ref 6.6–8.7)
WBC # BLD: 12.03 K/UL (ref 4.23–9.07)

## 2022-12-02 PROCEDURE — 99212 OFFICE O/P EST SF 10 MIN: CPT

## 2022-12-02 PROCEDURE — 3078F DIAST BP <80 MM HG: CPT | Performed by: NURSE PRACTITIONER

## 2022-12-02 PROCEDURE — 85025 COMPLETE CBC W/AUTO DIFF WBC: CPT

## 2022-12-02 PROCEDURE — 3074F SYST BP LT 130 MM HG: CPT | Performed by: NURSE PRACTITIONER

## 2022-12-02 PROCEDURE — 99213 OFFICE O/P EST LOW 20 MIN: CPT | Performed by: NURSE PRACTITIONER

## 2022-12-02 PROCEDURE — 36415 COLL VENOUS BLD VENIPUNCTURE: CPT

## 2022-12-02 RX ORDER — ATORVASTATIN CALCIUM 40 MG/1
40 TABLET, FILM COATED ORAL DAILY
COMMUNITY
Start: 2022-11-15

## 2022-12-02 RX ORDER — LOSARTAN POTASSIUM AND HYDROCHLOROTHIAZIDE 12.5; 5 MG/1; MG/1
50 TABLET ORAL DAILY
COMMUNITY
Start: 2022-11-11

## 2022-12-02 NOTE — PROGRESS NOTES
Progress Note      Pt Name: Leila Kwok  YOB: 1965  MRN: [id number]    Date of evaluation: 12/02/2022  History Obtained From:  patient, electronic medical record    CHIEF COMPLAINT:    Chief Complaint   Patient presents with    Follow-up     Colon cancer metastasized to lung [facility])    Discuss Labs     Chronically elevated CEA     HISTORY OF PRESENT ILLNESS:    Leila Kwok is a 62 y.o.  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 Brian Alicia has no known evidence of progression of disease of rectal, lung or melanoma. Colonoscopy on 6/2/2022 reported 2 tubular adenoma and hyperplastic polyps and chest xray on 9/24/2022 did not identify a mass/lesion. Brian Alicia returns today in follow up for evaluation, late side effect monitoring and lab monitoring. He reported doing well and denied any GI, respiratory or skin complaints. Today's clinic visit to include physical assessment, review of systems, any radiograph or lab findings that were available and plan of care are documented below. ONCOLOGIC HISTORY:   Diagnosis   Stage IIIB colon cancer , November 2017   Recurrence single nodule RLL, August 2017   Pulmonary emboli in the RLL artery, 1/22/2018  stage 0 in situ Malignant Melanoma of left lower nasal and left lateral nasal, 1/28/2022    Treatment summary  January 2017- He declined Adjuvant therapy   RLL lobectomy December 2017   Eliquis initiated on 2/13/2018-January 2019    Cancer history- colorectal stage IIIB node positive  The patient is a pleasant 46years old male first seen by me on 1/3/2017 referred by Dr. Pattie Morton for a diagnosis of stage IIIB colorectal adenocarcinoma status post laparoscopic assisted low anterior resection with primary anastomosis.   11/2/2016-he was seen by gastroenterologist due to a history of change in her bowel habits for the last 6 months and blood in the stools x one time. Father had a history of colon polyps. 11/18/2016- he underwent a colonoscopy with Dr. Raulito Garcia with the findings of a stenosing lesion at 15 cm from the anal verge. In addition, he was found to have several other polyps consistent with tubular adenoma high-grade. 11/30/2016- the patient underwent a low anterior resection laparoscopic-assisted by Dr. Amanuel Wagner at [facility] for a invasive moderate to poorly differentiated adenocarcinoma extending into subserosa adipose tissue with 4 out of 15 lymph nodes involved by metastatic disease. Margins were clear. Radial margin free of malignancy. No perineural invasion. No lymphovascular invasion. Final pathologic staging [id number] stage IIIB   December 8048 -Complicated postoperative course with an anastomotic leak and perirectal inflammation/infection. This is now resolving. 12/15/2016-CT scan of abdomen and pelvis revealed no focal liver or pancreas lesion. Mild stranding at the level of previous surgery as well as some presacral thickening. A small fluid/air collection within the presacral soft tissue measuring 2.4 cm in size, which contains some air. No significant lymphadenopathy noted. 1/3/2017-referred for discussion of adjuvant chemoradiation, declined treatment at present time. He was contemplative regarding the side effects and a small absolute benefit of 6% from the chemotherapy. He was explained in detail about the risk of local recurrence and distant metastasis in verbalized an understanding but he denied adjuvant chemotherapy or radiation at that time. Concerns and risk were thoroughly discussed with [facility]. 1/9/2017- CT scan of chest revealed no obvious adrenal pathology or significant adenopathy appreciated. COPD with no obvious lung nodularity other than occasional calcified granuloma. 3/24/2017-declined systemic adjuvant chemotherapy or RT. 08/04/2017- Surveillance CT scan of the abdomen was performed for a raising CEA at 6.5. The abdomen and pelvis was unremarkable but a 19 x 10 mm nodule was visualized in the right lower lobe.   08/11/2017- CT scan of the chest showed a 1.7 cm right lower lobe nodule abutting the medial pleura. Additional 6 mm nodule in the right middle lobe. 8/17/2017- PET CT scan showed a right lower lobe nodule measuring 1.8 cm (SUV 9.3). No abnormal lymph node activity within the mediastinum or hilar regions. The small right middle lobe pulmonary nodule does not demonstrate an increased activity. However this nodule measures only 6 mm in size and could be below the limits of resolution. Otherwise no other evidence of metastatic disease. He was   9/15/2017- the patient was seen by me for discussion of his scans. He has also been seen by Dr. Rodolfo Castillo was plans for surgery (lobectomy) in early November 2017.   12/6/2017- Right lower lobectomy by Dr Tayla Dickinson. Consistent with metastatic colonic adenocarcinoma. 5 mediastinal lymph nodes negative. 1/22/2018- 1Year recall colonoscopy by Dr. Demetria Lawrence documented a total of 6 polyps removed. Pathology documented all 6 polyps as having no evidence of malignancy. 1/22/2018- CT scan of the abdomen and pelvis documented a tiny nodule in the right adrenal gland that measures less than 1 cm in size and likely represents an adenoma, this has been present since 12/15/2016. No mass, lymphadenopathy or fluid collection is noted. The osseous structures did not demonstrate any evidence of bone metastasis. 2/22/2018- CT scan of the chest documented moderate emphysematous changes again noted in bilateral lungs. The previously noted nodule in the RLL has been resected. A small complex pleural fluid collection is in the posterior medial right lower thorax. Previously measured right hilar lymph node now measures 8 mm, previously measuring 1.5 cm.  A pulmonary embolus in the stump of the right lower lobar artery. 5/25/2018 - CT scan of the abdomen and pelvis documented no evidence of locally recurrent metastatic disease. 5/25/2018- CT scan of the chest documented no significant interval change from previous exam. Embolus stump in the pulmonary artery to the right lower lobe redemonstrated. No new enlarged axillary, hilar or mediastinal lymphadenopathy. Right hilar lymph node measures 9 mm with no change from previous exam.   12/7/2018- CT scan of abdomen and pelvis with contrast documented no evidence metastatic disease in the abdomen or pelvis. 12/7/2018- CT scan of the chest with contrast documented the pulmonary arteries being within normal limits. Mediastinal and hilar lymph nodes are stable since previous study. There is no interval enlargement or change. No evidence of recurrent or metastatic disease in the chest   4/12/2019- CT scan of the abdomen and pelvis with contrast documented no evidence of neoplastic/metastatic disease. No acute intra-abdominal process. 4/12/2019- CT scan of the chest with contrast documented right paratracheal node measuring 7 mm AP window node measuring 7 mm, right hilar node measuring 13 mm and left hilar node measuring 11 mm, all stable with no change compared to CT scan on 1/22/2018. No new developing mediastinal or hilar lymphadenopathy. Pulmonary emphysema identified. 2/21/2020-colonoscopy by Dr. Monisha Casiano reports that his colonoscopy was without concerning findings and he has a recall colonoscopy in February 2023. Unable to view results from colonoscopy in care everywhere, will request a copy of the colonoscopy results. 7/2/2020 - CT scan of the chest with contrast was stable with no radiographic evidence suggesting recurrent disease. The known borderline enlarged lymph nodes in the right peritracheal and mid right hilar areas are unchanged compared to CT scan of the chest on 4/12/2019. Advanced diffuse emphysema is noted.   11/19/2021 CT chest with Does not bruise/bleed easily. Psychiatric/Behavioral: Negative. The patient is not nervous/anxious. Objective   PHYSICAL EXAM:  Physical Exam  Vitals reviewed. Constitutional:       General: He is not in acute distress. Appearance: He is well-developed. HENT:      Head: Normocephalic and atraumatic. Nose:      Comments: Well healed surgical scar to nose     Mouth/Throat:      Pharynx: Uvula midline. Tonsils: No tonsillar exudate. Eyes:      General: Lids are normal.      Conjunctiva/sclera: Conjunctivae normal.      Pupils: Pupils are equal, round, and reactive to light. Neck:      Thyroid: No thyroid mass or thyromegaly. Vascular: No JVD. Trachea: Trachea normal. No tracheal deviation. Cardiovascular:      Rate and Rhythm: Normal rate and regular rhythm. Pulses: Normal pulses. Heart sounds: Normal heart sounds. Pulmonary:      Effort: Pulmonary effort is normal. No respiratory distress. Breath sounds: Normal breath sounds. No wheezing or rales. Chest:      Chest wall: No tenderness. Abdominal:      General: Bowel sounds are normal. There is no distension. Palpations: Abdomen is soft. There is no mass. Tenderness: There is no abdominal tenderness. There is no guarding. Musculoskeletal:         General: No tenderness or deformity. Cervical back: Normal range of motion and neck supple. Comments: Range of motion within normal limits x4 extremities   Skin:     General: Skin is warm. Findings: No bruising, erythema or rash. Neurological:      Mental Status: He is alert and oriented to person, place, and time. Cranial Nerves: No cranial nerve deficit. Coordination: Coordination normal.   Psychiatric:         Behavior: Behavior normal.         Thought Content:  Thought content normal.       Labs:   Lab Results   Component Value Date    WBC 12.03 (H) 12/02/2022    HGB 16.9 12/02/2022    HCT 50.1 12/02/2022    MCV 88.0 12/02/2022     12/02/2022     Lab Results   Component Value Date    NEUTROABS 8.67 (H) 12/02/2022       CBC is within acceptable limits      ASSESSMENT/PLAN:      1. Colon cancer metastasized to lung [facility]), initially diagnosed with stage IIIb November 2016. Right lower lobe lung nodule December 2017, status post lobectomy. No known evidence of progression of disease. Colonoscopy on 6/2/2022 reported 2 tubular adenoma and hyperplastic polyps and chest xray on 9/24/2022 did not identify a mass/lesion. Denied any GI or respiratory complaints. -CBC, CEA and CMP today   -Schedule CT low-dose CT scan of the chest at [facility]    2. Chronically elevated CEA, suspect secondary to tobacco use. - CEA today and at every follow-up visit    3. History of pulmonary embolism, completed 11 months of anticoagulation. No recurrent PE. Remains asymptomatic denies any shortness of air or chest pain. No extremity swelling. 4.  Tobacco abuse counseling, continues to smoke 1 pack of cigarettes daily   We talked about the importance of quitting smoking for approximately 4-5 minutes. Specifically, we discussed the risk related tobacco including but not limited to carcinoma, cardiovascular disease, stroke, and financial loss. I advised to quit smoking and offered resources to include nicotine patches to help with the craving. The patient is contemplated at this time. I will follow-up on smoking cessation during the next visit and continue to encourage quitting tobacco use. 5. Stage 0 in situ malignant melanoma of the nose 1/28/2022.,  Status postsurgical removal and plastic surgery. No noted new skin lesions to exposed skin (Face, neck and forearms) with physical exam today    -Continue to follow with Dr. Price as recommended, every 6 months    6. Mild leukocytosis, waxes and wanes. WBC of 12.03 and ANC of 8.67 with 21.3% lymphocytes.   Afebrile and denies any B symptoms.    - Continue conservative monitoring, hemoglobin and platelet count within normal limits      7. Hypertension currently on losartan/hydrochlorothiazide and previously managed by Dr. Patricia Hudson. Has requested a referral for a new PCP. BP is 100/62 today, stable. Reports compliant with BP medication. Recommend continuing current dosing  -Referral initially made to Dr. Emeka Phillips, Currently not taking new patients and then a referral sent to Dr. José Miguel Dumont      I discussed all of the above findings included in the assessment and plan with the patient and the patient is in agreement to move forward with current recommendations/treatment. I have addressed all of their questions and concerns that were verbalized. FOLLOW UP:  Follow-up appointment given for 12 months, or sooner if needed  Follow-up with other medical providers as recommended  Labs at next visit: CBC and CEA    EMR Dragon/Transcription disclaimer:   Much of this encounter note is an electronic transcription/translation of spoken language to printed text. The electronic translation of spoken language may permit erroneous, or at times, nonsensical words or phrases to be inadvertently transcribed; although attempts have made to review the note for such errors, some may still exist.  Please excuse any unrecognized transcription errors and contact us if the error is unintelligible or needs documented correction. Also, portions of this note have been copied forward, however, changed to reflect the most current clinical status of this patient. Electronically signed by JACLYN Alfaro on 12/8/2022 at 5:42 PM  Zach SHEEHAN am pre-charting as a registered nurse for JACLYN Wilson.

## 2022-12-05 DIAGNOSIS — I10 PRIMARY HYPERTENSION: Primary | ICD-10-CM

## 2022-12-08 ASSESSMENT — ENCOUNTER SYMPTOMS
EYE REDNESS: 0
EYE DISCHARGE: 0
BACK PAIN: 0
NAUSEA: 0
CONSTIPATION: 0
COUGH: 0
SORE THROAT: 0
ABDOMINAL PAIN: 0
RESPIRATORY NEGATIVE: 1
GASTROINTESTINAL NEGATIVE: 1
DIARRHEA: 0
EYES NEGATIVE: 1
BLOOD IN STOOL: 0
SHORTNESS OF BREATH: 0
VOMITING: 0
EYE PAIN: 0
WHEEZING: 0

## 2023-01-06 ENCOUNTER — PROCEDURE VISIT (OUTPATIENT)
Dept: ENT CLINIC | Age: 58
End: 2023-01-06

## 2023-01-06 ENCOUNTER — OFFICE VISIT (OUTPATIENT)
Dept: ENT CLINIC | Age: 58
End: 2023-01-06

## 2023-01-06 VITALS
SYSTOLIC BLOOD PRESSURE: 128 MMHG | WEIGHT: 184 LBS | BODY MASS INDEX: 26.34 KG/M2 | DIASTOLIC BLOOD PRESSURE: 72 MMHG | HEIGHT: 70 IN

## 2023-01-06 DIAGNOSIS — H61.23 BILATERAL IMPACTED CERUMEN: ICD-10-CM

## 2023-01-06 DIAGNOSIS — H90.3 ASYMMETRICAL SENSORINEURAL HEARING LOSS: Primary | ICD-10-CM

## 2023-01-06 DIAGNOSIS — H90.3 SENSORINEURAL HEARING LOSS (SNHL) OF BOTH EARS: Primary | ICD-10-CM

## 2023-01-06 DIAGNOSIS — H91.8X9 ASYMMETRICAL HEARING LOSS: ICD-10-CM

## 2023-01-06 RX ORDER — LEVOFLOXACIN 500 MG/1
TABLET, FILM COATED ORAL
COMMUNITY
Start: 2022-12-30

## 2023-01-06 ASSESSMENT — ENCOUNTER SYMPTOMS
SORE THROAT: 0
RHINORRHEA: 0
TROUBLE SWALLOWING: 0
PHOTOPHOBIA: 0
SINUS PAIN: 0
EYE PAIN: 0
FACIAL SWELLING: 0
VOICE CHANGE: 0
SINUS PRESSURE: 0

## 2023-01-06 NOTE — ASSESSMENT & PLAN NOTE
Bilateral cerumen impaction-successfully removed with microscopic guidance  Plan: Patient is follow-up in 6 months for a cerumen check.   He was reminded to call if he has any questions or problems

## 2023-01-06 NOTE — PROGRESS NOTES
History   Rl Kim is a 62 y.o. male who presented to the clinic this date with complaints of decreased hearing in the left. He has history of cerumen occlusion and had to have his ears cleaned often. A couple of years ago he had his ear cleaned and has had decreased hearing in that ear since that time. He noted it has gotten worse in the last year. His has history of cancer but has not had chemo or radiation. Summary   Bilateral occluding cerumen was removed in ENT clinic prior to testing. Tympanometry consistent with normal TM mobility bilaterally. Pure tone testing indicates normal to moderately severe SNHL right and mild to severe SNHL left. Word recognition scores were excellent in the right ear and good in the left ear. Results   Otoscopy:   Right: Clear EAC/Normal TM  Left: Clear EAC/Normal TM    Audiometry:   Right:  Normal to moderately severe  sloping SNHL  Left:  Mild to severe  sloping SNHL         Tympanometry:    Right: Type A  Left: Type A    Plan   Results of today's testing were discussed with Mr. StevensonAnaya Merlynjyoti and the following recommendations were made: Follow up with ENT as scheduled. Hearing aid evaluation as desired pending medical clearance. Monitor hearing yearly, sooner with changes. Hearing protection as warranted.         Audiogram and Acoustic Immittance

## 2023-01-06 NOTE — ASSESSMENT & PLAN NOTE
Left asymmetrical sensorineural hearing loss-confirmed today by audiology screening  Plan: I have recommended an MRI of the left IAC to rule out an acoustic neuroma. Patient is agreeable and wishes to proceed. I will call him the results with further recommendations to follow.

## 2023-01-06 NOTE — PROGRESS NOTES
[facility] OTOLARYNGOLOGY/ENT  Mr. Maite Bryant is a pleasant 59-year-old  male that was referred by Dr. Zen Connelly in [facility] due to asymmetrical hearing loss of the left ear. Patient reports that he noticed this about 2 years ago and has never improved. He has a longstanding history of recurrent cerumen impactions that have been previously managed by Dr. Solis Beck. He denies the use of Q-tips or wearing hearing protection farming. Audiology studies were completed today and demonstrated the patient to have an obvious left asymmetrical sensorineural hearing loss. Due to this asymmetry, radiological studies have been recommended. Allergies: Patient has no known allergies. Current Outpatient Medications   Medication Sig Dispense Refill    levoFLOXacin (LEVAQUIN) 500 MG tablet       atorvastatin (LIPITOR) 40 MG tablet Take 40 mg by mouth daily      losartan-hydroCHLOROthiazide (HYZAAR) 50-12.5 MG per tablet Take 50 tablets by mouth daily      acetaminophen (TYLENOL) 325 MG tablet Take 650 mg by mouth       No current facility-administered medications for this visit.        Past Surgical History:   Procedure Laterality Date    CARDIAC CATHETERIZATION      COLECTOMY      COLON SURGERY      at [facility]    LOBECTOMY Right 12/6/2017    RIGHT THORACOTOMY; RIGHT LOWER  LOBECTOMY performed by Althea Wallis MD at [address]         Past Medical History:   Diagnosis Date    Colon cancer ([facility] [address]     surgery only    COPD (chronic obstructive pulmonary disease) ([facility] [address]     Hyperlipidemia     Upper respiratory infection     recent tx with antibiotics and prednisone       Family History   Problem Relation Age of Onset    Other Mother         hx. benign lung nodule    Other Father         nasra damon    Colon Polyps Father        Social History     Tobacco Use    Smoking status: Every Day     Packs/day: 1.00     Years: 25.00     Pack years: 25.00     Types: Cigarettes     Last attempt to quit: 6/24/2021     Years since quittin.5    Smokeless tobacco: Never   Substance Use Topics    Alcohol use: Yes     Alcohol/week: 12.0 standard drinks     Types: 12 Cans of beer per week     Comment: avg per week/mostly fri and sat nights           REVIEW OF SYSTEMS:  all other systems reviewed and are negative  Review of Systems   Constitutional:  Negative for chills and fever. HENT:  Negative for congestion, dental problem, ear discharge, ear pain, facial swelling, hearing loss, postnasal drip, rhinorrhea, sinus pressure, sinus pain, sore throat, tinnitus, trouble swallowing and voice change. Eyes:  Negative for photophobia and pain. Neurological:  Negative for dizziness and headaches. Comments:     PHYSICAL EXAM:    /72   Ht 5' 10\" (1.778 m)   Wt 184 lb (83.5 kg)   BMI 26.40 kg/m²   Body mass index is 26.4 kg/m². General Appearance: well developed  and well nourished  Head/ Face: normocephalic and atraumatic  Vocal Quality: good/ normal  Ears: Right Ear: External: external ears normal Otoscopy Ear Canal: cerumen impaction Otoscopy TM: TM's normal and TM's mobile Left Ear: External: external ears normal Otoscopy Ear Canal: cerumen impaction Otoscopy TM: TM's normal and TM's mobile  Hearing: Rinne A>B: Left, Rinne A>B: Right, and Brasher M  Nose: nares normal and septum midline  Neck: supple and adenopathy none palpable  Thyroid: normal  Oral exam was unremarkable. Assessment & Plan:    Problem List Items Addressed This Visit       Asymmetrical sensorineural hearing loss - Primary     Left asymmetrical sensorineural hearing loss-confirmed today by audiology screening  Plan: I have recommended an MRI of the left IAC to rule out an acoustic neuroma. Patient is agreeable and wishes to proceed. I will call him the results with further recommendations to follow.          Relevant Orders    MRI IAC POSTERIOR FOSSA W WO CONTRAST    Bilateral impacted cerumen     Bilateral cerumen impaction-successfully removed with microscopic guidance  Plan: Patient is follow-up in 6 months for a cerumen check. He was reminded to call if he has any questions or problems         Relevant Orders    84923 - [place] REMOVE IMPACTED EAR WAX (Completed)       Orders Placed This Encounter   Procedures    MRI IAC POSTERIOR FOSSA W WO CONTRAST     Standing Status:   Future     Standing Expiration Date:   1/6/2024     Scheduling Instructions:      LMP     Order Specific Question:   STAT Creatinine as needed:     Answer:   No     Order Specific Question:   Reason for exam:     Answer:   Left asymmetrical sensorineural hearing loss     Order Specific Question:   What is the sedation requirement? Answer:   None    11268 - [place] REMOVE IMPACTED EAR WAX     With microscopic guidance, the cerumen impaction was removed with suction and alligator graspers. Copious amounts of ceruminous material was expressed with no complications. After disimpaction, the TMs appear to be normal with no evidence of perforation or infection. Of note, patient is noted to have scar tissue at the 9 o'clock position of the left ear canal from questionable etiology. Overall this is felt to be the attachment point for the cerumen based on his history       No orders of the defined types were placed in this encounter. Electronically signed by Guerline Alarcon PA-C on 1/6/23 at 10:29 AM CST        Please note that this chart was generated using dragon dictation software. Although every effort was made to ensure the accuracy of this automated transcription, some errors in transcription may have occurred.

## 2023-01-20 ENCOUNTER — HOSPITAL ENCOUNTER (OUTPATIENT)
Dept: MRI IMAGING | Age: 58
Discharge: HOME | End: 2023-01-20
Payer: COMMERCIAL

## 2023-01-20 DIAGNOSIS — H90.3 ASYMMETRICAL SENSORINEURAL HEARING LOSS: ICD-10-CM

## 2023-01-20 PROCEDURE — 6360000004 HC RX CONTRAST MEDICATION: Performed by: PHYSICIAN ASSISTANT

## 2023-01-20 PROCEDURE — A9585 GADOBUTROL INJECTION: HCPCS | Performed by: PHYSICIAN ASSISTANT

## 2023-01-20 PROCEDURE — 70553 MRI BRAIN STEM W/O & W/DYE: CPT

## 2023-01-20 RX ADMIN — GADOBUTROL 8.5 ML: 604.72 INJECTION INTRAVENOUS at 11:40

## 2023-01-23 ENCOUNTER — TELEPHONE (OUTPATIENT)
Dept: ENT CLINIC | Age: 58
End: 2023-01-23

## 2023-01-24 ENCOUNTER — TELEPHONE (OUTPATIENT)
Dept: ENT CLINIC | Age: 58
End: 2023-01-24

## 2023-01-24 NOTE — TELEPHONE ENCOUNTER
MRI of the IAC results were called to the patient. After personal review I did not see any evidence of an acoustic neuroma or any gross abnormalities. Patient was advised to proceed with hearing aid trial.  Patient reports he is still able to communicate fairly well and will hold off on getting a hearing aid. He was reminded to get a repeat hearing screening in 1 year. I will also see him in 6 months for a cerumen check. He was reminded call if he has any questions or problems.       Electronically signed by Jihan Freire PA-C on 1/24/23 at 4:54 PM CST

## 2023-07-14 ENCOUNTER — OFFICE VISIT (OUTPATIENT)
Dept: ENT CLINIC | Age: 58
End: 2023-07-14

## 2023-07-14 VITALS
BODY MASS INDEX: 26.34 KG/M2 | HEIGHT: 70 IN | DIASTOLIC BLOOD PRESSURE: 72 MMHG | WEIGHT: 184 LBS | SYSTOLIC BLOOD PRESSURE: 130 MMHG

## 2023-07-14 DIAGNOSIS — H61.23 BILATERAL IMPACTED CERUMEN: Primary | ICD-10-CM

## 2023-07-14 RX ORDER — ROSUVASTATIN CALCIUM 5 MG/1
TABLET, COATED ORAL
COMMUNITY
Start: 2023-05-19

## 2023-07-14 NOTE — PROGRESS NOTES
Mr. Paulina Chiang is a pleasant 77-year-old  male that presents for a 6-month cerumen check. Patient reports that he has noticed gradual diminished hearing that is worse to the left ear. He denies any drainage from the canals. Physical examination with the microscope confirmed bilateral cerumen impactions to be present. This was removed bilaterally with assistance of suction and alligator graspers without any complications. Copious amounts of ceruminous material was expressed successfully. After disimpaction, the TM appeared to be normal bilaterally. Neck exam demonstrated no lymphadenopathy or thyromegaly. Impression: Successful cerumen disimpaction bilaterally with microscopic guidance and instrumentation    Plan: Patient is to follow-up in 6 months for cerumen check. He was reminded to call if he has any questions or problems.       Electronically signed by Gina Alfaro PA-C on 7/14/23 at 12:45 PM CDT

## 2023-12-07 ENCOUNTER — TELEPHONE (OUTPATIENT)
Dept: HEMATOLOGY | Age: 58
End: 2023-12-07

## 2023-12-08 ENCOUNTER — HOSPITAL ENCOUNTER (OUTPATIENT)
Dept: INFUSION THERAPY | Age: 58
Discharge: HOME | End: 2023-12-08
Payer: COMMERCIAL

## 2023-12-08 ENCOUNTER — OFFICE VISIT (OUTPATIENT)
Dept: HEMATOLOGY | Age: 58
End: 2023-12-08
Payer: COMMERCIAL

## 2023-12-08 VITALS
DIASTOLIC BLOOD PRESSURE: 78 MMHG | HEIGHT: 70 IN | HEART RATE: 69 BPM | TEMPERATURE: 98.1 F | OXYGEN SATURATION: 94 % | WEIGHT: 181.8 LBS | BODY MASS INDEX: 26.03 KG/M2 | SYSTOLIC BLOOD PRESSURE: 132 MMHG

## 2023-12-08 DIAGNOSIS — Z86.711 HISTORY OF PULMONARY EMBOLISM: ICD-10-CM

## 2023-12-08 DIAGNOSIS — Z12.2 SCREENING FOR LUNG CANCER: Primary | ICD-10-CM

## 2023-12-08 DIAGNOSIS — F17.200 SMOKER: ICD-10-CM

## 2023-12-08 DIAGNOSIS — C18.9 COLON CANCER METASTASIZED TO LUNG (HCC): ICD-10-CM

## 2023-12-08 DIAGNOSIS — C78.00 COLON CANCER METASTASIZED TO LUNG (HCC): ICD-10-CM

## 2023-12-08 DIAGNOSIS — C78.00 COLON CANCER METASTASIZED TO LUNG (HCC): Primary | ICD-10-CM

## 2023-12-08 DIAGNOSIS — C18.9 COLON CANCER METASTASIZED TO LUNG (HCC): Primary | ICD-10-CM

## 2023-12-08 DIAGNOSIS — C43.31 MALIGNANT MELANOMA OF NOSE (HCC): ICD-10-CM

## 2023-12-08 LAB
ALBUMIN SERPL-MCNC: 4.3 G/DL (ref 3.5–5.2)
ALP SERPL-CCNC: 90 U/L (ref 40–130)
ALT SERPL-CCNC: 21 U/L (ref 21–72)
ANION GAP SERPL CALCULATED.3IONS-SCNC: 5 MMOL/L (ref 7–19)
AST SERPL-CCNC: 48 U/L (ref 17–59)
BASOPHILS # BLD: 0.06 K/UL (ref 0.01–0.08)
BASOPHILS NFR BLD: 0.7 % (ref 0.1–1.2)
BILIRUB SERPL-MCNC: 0.5 MG/DL (ref 0.2–1.3)
BUN SERPL-MCNC: 14 MG/DL (ref 9–20)
CALCIUM SERPL-MCNC: 9.7 MG/DL (ref 8.4–10.2)
CEA SERPL-MCNC: 6.1 NG/ML (ref 0–4.7)
CHLORIDE SERPL-SCNC: 104 MMOL/L (ref 98–111)
CO2 SERPL-SCNC: 29 MMOL/L (ref 22–29)
CREAT SERPL-MCNC: 0.9 MG/DL (ref 0.6–1.2)
EOSINOPHIL # BLD: 0.09 K/UL (ref 0.04–0.54)
EOSINOPHIL NFR BLD: 1.1 % (ref 0.7–7)
ERYTHROCYTE [DISTWIDTH] IN BLOOD BY AUTOMATED COUNT: 13.2 % (ref 11.6–14.4)
GLOBULIN: 3.2 G/DL
GLUCOSE SERPL-MCNC: 105 MG/DL (ref 74–106)
HCT VFR BLD AUTO: 49.2 % (ref 40.1–51)
HGB BLD-MCNC: 17.2 G/DL (ref 13.7–17.5)
LYMPHOCYTES # BLD: 1.92 K/UL (ref 1.18–3.74)
LYMPHOCYTES NFR BLD: 23.1 % (ref 19.3–53.1)
MCH RBC QN AUTO: 30.9 PG (ref 25.7–32.2)
MCHC RBC AUTO-ENTMCNC: 35 G/DL (ref 32.3–36.5)
MCV RBC AUTO: 88.5 FL (ref 79–92.2)
MONOCYTES # BLD: 0.51 K/UL (ref 0.24–0.82)
MONOCYTES NFR BLD: 6.1 % (ref 4.7–12.5)
NEUTROPHILS # BLD: 5.69 K/UL (ref 1.56–6.13)
NEUTS SEG NFR BLD: 68.6 % (ref 34–71.1)
PLATELET # BLD AUTO: 278 K/UL (ref 163–337)
PMV BLD AUTO: 8.9 FL (ref 7.4–10.4)
POTASSIUM SERPL-SCNC: 4 MMOL/L (ref 3.5–5.1)
PROT SERPL-MCNC: 7.5 G/DL (ref 6.3–8.2)
RBC # BLD AUTO: 5.56 M/UL (ref 4.63–6.08)
SODIUM SERPL-SCNC: 138 MMOL/L (ref 137–145)
WBC # BLD AUTO: 8.3 K/UL (ref 4.23–9.07)

## 2023-12-08 PROCEDURE — 36415 COLL VENOUS BLD VENIPUNCTURE: CPT

## 2023-12-08 PROCEDURE — 99214 OFFICE O/P EST MOD 30 MIN: CPT | Performed by: NURSE PRACTITIONER

## 2023-12-08 PROCEDURE — 80053 COMPREHEN METABOLIC PANEL: CPT

## 2023-12-08 PROCEDURE — 99212 OFFICE O/P EST SF 10 MIN: CPT

## 2023-12-08 PROCEDURE — 85025 COMPLETE CBC W/AUTO DIFF WBC: CPT

## 2023-12-08 NOTE — PROGRESS NOTES
Progress Note      Pt Name: Laurita Nuno  YOB: 1965  MRN: [id number]    Date of evaluation: 12/08/2023  History Obtained From:  patient, electronic medical record    CHIEF COMPLAINT:    Chief Complaint   Patient presents with    Follow-up     Colon cancer metastasized to lung     HISTORY OF PRESENT ILLNESS:    Laurita Nuno is a 62 y.o.  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 Sofiya Hernandez has no known evidence of progression of disease of rectal, lung or melanoma. Sofiya Hernandez returns today in follow up for evaluation, late side effect monitoring and lab monitoring. Today's clinic visit to include physical assessment, review of systems, any radiograph or lab findings that were available and plan of care are documented below. ONCOLOGIC HISTORY:   Diagnosis   Stage IIIB colon cancer , November 2017   Recurrence single nodule RLL, August 2017   Pulmonary emboli in the RLL artery, 1/22/2018  stage 0 in situ Malignant Melanoma of left lower nasal and left lateral nasal, 1/28/2022    Treatment summary  January 2017- He declined Adjuvant therapy   RLL lobectomy December 2017   Eliquis initiated on 2/13/2018-January 2019    Cancer history- colorectal stage IIIB node positive  The patient is a pleasant 46years old male first seen by me on 1/3/2017 referred by Dr. Carlitos Zhang for a diagnosis of stage IIIB colorectal adenocarcinoma status post laparoscopic assisted low anterior resection with primary anastomosis. 11/2/2016-he was seen by gastroenterologist due to a history of change in her bowel habits for the last 6 months and blood in the stools x one time. Father had a history of colon polyps. 11/18/2016- he underwent a colonoscopy with Dr. Zelda Conteh with the findings of a stenosing lesion at 15 cm from the anal verge.

## 2024-01-19 ENCOUNTER — OFFICE VISIT (OUTPATIENT)
Dept: ENT CLINIC | Age: 59
End: 2024-01-19

## 2024-01-19 VITALS
DIASTOLIC BLOOD PRESSURE: 72 MMHG | WEIGHT: 180 LBS | SYSTOLIC BLOOD PRESSURE: 134 MMHG | HEIGHT: 70 IN | BODY MASS INDEX: 25.77 KG/M2

## 2024-01-19 DIAGNOSIS — H61.23 BILATERAL IMPACTED CERUMEN: Primary | ICD-10-CM

## 2024-01-19 NOTE — PROGRESS NOTES
Mr. Gu is a pleasant 58-year-old  male that presents for a 6-month cerumen check.  Patient reports he has noticed gradual diminished hearing and believes that he has recurrent cerumen impactions.  He denies any drainage from the canals or any additional problems.      Physical examination with the microscope confirmed bilateral cerumen impactions to be present.  This was successfully removed with alligator graspers and suction with no complications.-Please refer to separate dictated procedure note  After disimpaction the TMs appear to be normal with no evidence of perforation or infection.  Neck exam demonstrated no lymphadenopathy or thyromegaly.      Impression: Successful cerumen disimpaction with microscopy and instrumentation bilaterally    Plan: Patient is to follow-up in 6 months for cerumen check.  He was reminded to call if he has any questions or concerns.      Electronically signed by ELIDIA MCINTYRE PA-C on 1/19/24 at 2:07 PM CST

## 2024-07-19 ENCOUNTER — OFFICE VISIT (OUTPATIENT)
Dept: ENT CLINIC | Age: 59
End: 2024-07-19
Payer: COMMERCIAL

## 2024-07-19 VITALS
DIASTOLIC BLOOD PRESSURE: 74 MMHG | SYSTOLIC BLOOD PRESSURE: 128 MMHG | BODY MASS INDEX: 26.05 KG/M2 | WEIGHT: 182 LBS | HEIGHT: 70 IN

## 2024-07-19 DIAGNOSIS — H61.23 BILATERAL IMPACTED CERUMEN: Primary | ICD-10-CM

## 2024-07-19 PROCEDURE — 69210 REMOVE IMPACTED EAR WAX UNI: CPT | Performed by: PHYSICIAN ASSISTANT

## 2024-07-19 PROCEDURE — 99212 OFFICE O/P EST SF 10 MIN: CPT | Performed by: PHYSICIAN ASSISTANT

## 2024-07-22 NOTE — PROGRESS NOTES
Mr. Gu is a pleasant 59-year-old  male that presents for a 3-month cerumen check.  Patient reports that he forgot about his last appointment and is now 6 months from his previous cleaning.  He reports he has noticed gradual diminished hearing as well as itching of the canals.      Physical examination with the microscope confirmed bilateral cerumen impactions to be present.  This was successfully removed with alligator graspers and suction with no complications.  After disimpaction, the TMs appeared to be normal.-Please refer to separate dictated procedure note  Neck exam demonstrated no lymphadenopathy or thyromegaly.      Impression: Successful cerumen disimpaction with microscopy and instrumentation-bilateral    Plan: Patient is to follow-up in 3 months for cerumen check.  He was reminded to call if he has any questions or concerns.      Electronically signed by ELIDIA MCINTYRE PA-C on 7/22/24 at 5:23 PM CDT

## 2024-12-04 ENCOUNTER — TELEPHONE (OUTPATIENT)
Dept: HEMATOLOGY | Age: 59
End: 2024-12-04

## 2024-12-04 NOTE — TELEPHONE ENCOUNTER
I called patient and left detailed voicemail about their appointment on 12/06/24. I made patient aware not to arrive any earlier than the appointment time and to come at the time of the follow up not the time of the lab appointment if it is different than the follow up appt time. I also made patient aware to eat and drink plenty of water to hydrate properly before coming to these appointments because this will make their lab draw much easier. Made patient aware that we are now located at the [facility] at [address] [place] [address]. Located between [facility] and the [facility].

## 2024-12-06 ENCOUNTER — HOSPITAL ENCOUNTER (OUTPATIENT)
Dept: INFUSION THERAPY | Age: 59
Discharge: HOME | End: 2024-12-06
Payer: COMMERCIAL

## 2024-12-06 ENCOUNTER — OFFICE VISIT (OUTPATIENT)
Dept: HEMATOLOGY | Age: 59
End: 2024-12-06
Payer: COMMERCIAL

## 2024-12-06 VITALS
TEMPERATURE: 97.9 F | SYSTOLIC BLOOD PRESSURE: 128 MMHG | HEART RATE: 93 BPM | HEIGHT: 70 IN | DIASTOLIC BLOOD PRESSURE: 88 MMHG | BODY MASS INDEX: 25.13 KG/M2 | WEIGHT: 175.5 LBS | OXYGEN SATURATION: 98 %

## 2024-12-06 DIAGNOSIS — C18.9 COLON CANCER METASTASIZED TO LUNG (HCC): ICD-10-CM

## 2024-12-06 DIAGNOSIS — C18.9 COLON CANCER METASTASIZED TO LUNG (HCC): Primary | ICD-10-CM

## 2024-12-06 DIAGNOSIS — Z12.2 SCREENING FOR LUNG CANCER: ICD-10-CM

## 2024-12-06 DIAGNOSIS — C78.00 COLON CANCER METASTASIZED TO LUNG (HCC): Primary | ICD-10-CM

## 2024-12-06 DIAGNOSIS — C78.00 COLON CANCER METASTASIZED TO LUNG (HCC): ICD-10-CM

## 2024-12-06 DIAGNOSIS — Z86.711 HISTORY OF PULMONARY EMBOLISM: ICD-10-CM

## 2024-12-06 LAB
ALBUMIN SERPL-MCNC: 4.2 G/DL (ref 3.5–5.2)
ALP SERPL-CCNC: 84 U/L (ref 40–129)
ALT SERPL-CCNC: 13 U/L (ref 5–41)
ANION GAP SERPL CALCULATED.3IONS-SCNC: 10 MMOL/L (ref 7–19)
AST SERPL-CCNC: 17 U/L (ref 5–40)
BASOPHILS # BLD: 0.05 K/UL (ref 0.01–0.08)
BASOPHILS NFR BLD: 0.6 % (ref 0.1–1.2)
BILIRUB SERPL-MCNC: 0.3 MG/DL (ref 0–1.2)
BUN SERPL-MCNC: 14 MG/DL (ref 6–20)
CALCIUM SERPL-MCNC: 9.5 MG/DL (ref 8.6–10)
CEA SERPL-MCNC: 5.9 NG/ML (ref 0–4.7)
CHLORIDE SERPL-SCNC: 102 MMOL/L (ref 98–107)
CO2 SERPL-SCNC: 28 MMOL/L (ref 22–29)
CREAT SERPL-MCNC: 0.9 MG/DL (ref 0.7–1.2)
EOSINOPHIL # BLD: 0.17 K/UL (ref 0.04–0.54)
EOSINOPHIL NFR BLD: 1.9 % (ref 0.7–7)
ERYTHROCYTE [DISTWIDTH] IN BLOOD BY AUTOMATED COUNT: 13.3 % (ref 11.6–14.4)
GLUCOSE SERPL-MCNC: 95 MG/DL (ref 70–99)
HCT VFR BLD AUTO: 48 % (ref 40.1–51)
HGB BLD-MCNC: 16.8 G/DL (ref 13.7–17.5)
LYMPHOCYTES # BLD: 2.44 K/UL (ref 1.18–3.74)
LYMPHOCYTES NFR BLD: 27.7 % (ref 19.3–53.1)
MCH RBC QN AUTO: 31.7 PG (ref 25.7–32.2)
MCHC RBC AUTO-ENTMCNC: 35 G/DL (ref 32.3–36.5)
MCV RBC AUTO: 90.6 FL (ref 79–92.2)
MONOCYTES # BLD: 0.62 K/UL (ref 0.24–0.82)
MONOCYTES NFR BLD: 7 % (ref 4.7–12.5)
NEUTROPHILS # BLD: 5.46 K/UL (ref 1.56–6.13)
NEUTS SEG NFR BLD: 62 % (ref 34–71.1)
PLATELET # BLD AUTO: 318 K/UL (ref 163–337)
PMV BLD AUTO: 9.4 FL (ref 7.4–10.4)
POTASSIUM SERPL-SCNC: 4.2 MMOL/L (ref 3.5–5.1)
PROT SERPL-MCNC: 7 G/DL (ref 6.4–8.3)
RBC # BLD AUTO: 5.3 M/UL (ref 4.63–6.08)
SODIUM SERPL-SCNC: 140 MMOL/L (ref 136–145)
WBC # BLD AUTO: 8.81 K/UL (ref 4.23–9.07)

## 2024-12-06 PROCEDURE — 99213 OFFICE O/P EST LOW 20 MIN: CPT

## 2024-12-06 PROCEDURE — 80053 COMPREHEN METABOLIC PANEL: CPT

## 2024-12-06 PROCEDURE — 85025 COMPLETE CBC W/AUTO DIFF WBC: CPT

## 2024-12-06 PROCEDURE — 36415 COLL VENOUS BLD VENIPUNCTURE: CPT

## 2024-12-06 PROCEDURE — 99213 OFFICE O/P EST LOW 20 MIN: CPT | Performed by: NURSE PRACTITIONER

## 2024-12-06 NOTE — PROGRESS NOTES
Progress Note      Pt Name: Jaguar Gu  YOB: 1965  MRN: [id number]    Date of evaluation: 12/06/2024  History Obtained From:  patient, electronic medical record    CHIEF COMPLAINT:    Chief Complaint   Patient presents with    Follow-up     Screening for lung cancer     HISTORY OF PRESENT ILLNESS:    Jaguar Gu is a 59 y.o.  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  Jaguar has no known evidence of progression of disease of rectal, lung or melanoma.  Jaguar returns today in follow up for evaluation, late side effect monitoring and lab monitoring.     History of Present Illness  He underwent a colonoscopy in July 2024, performed by Dr. Lee at [facility], and was informed that the results were satisfactory. He reports no changes in his bowel habits.    He continues to smoke approximately one pack of cigarettes daily. His weight has remained stable, fluctuating between 170 and 180 pounds.    He experienced a bout of croup, which was treated with prednisone and antibiotics. Although the acute symptoms have resolved, he still feels some residual effects in his chest.    He has been under the care of a dermatologist, Dr. Price, since 2021, with biannual visits. His most recent appointment was in September 2024. He reports no skin issues.       Today's clinic visit to include physical assessment, review of systems, any radiograph or lab findings that were available and plan of care are documented below.       ONCOLOGIC HISTORY:   Diagnosis   Stage IIIB colon cancer , November 2017   Recurrence single nodule RLL, August 2017   Pulmonary emboli in the RLL artery, 1/22/2018  stage 0 in situ Malignant Melanoma of left lower nasal and left lateral nasal, 1/28/2022    Treatment summary  January 2017- He declined Adjuvant therapy

## 2024-12-12 ASSESSMENT — ENCOUNTER SYMPTOMS
COUGH: 0
BLOOD IN STOOL: 0
EYES NEGATIVE: 1
EYE REDNESS: 0
NAUSEA: 0
VOMITING: 0
BACK PAIN: 0
EYE PAIN: 0
WHEEZING: 0
SORE THROAT: 0
GASTROINTESTINAL NEGATIVE: 1
EYE DISCHARGE: 0
DIARRHEA: 0
RESPIRATORY NEGATIVE: 1
CONSTIPATION: 0
ABDOMINAL PAIN: 0
SHORTNESS OF BREATH: 0

## 2025-01-03 ENCOUNTER — TELEPHONE (OUTPATIENT)
Dept: HEMATOLOGY | Age: 60
End: 2025-01-03

## 2025-01-03 DIAGNOSIS — C78.00 COLON CANCER METASTASIZED TO LUNG (HCC): Primary | ICD-10-CM

## 2025-01-03 DIAGNOSIS — R91.1 INCIDENTAL LUNG NODULE, LESS THAN OR EQUAL TO 3MM: ICD-10-CM

## 2025-01-03 DIAGNOSIS — C18.9 COLON CANCER METASTASIZED TO LUNG (HCC): Primary | ICD-10-CM

## 2025-01-03 NOTE — TELEPHONE ENCOUNTER
Spoke with Mr. Gu related to low-dose CT scan of the chest imaging completed on 12/27/2024 at [facility] that reported a new 3 mm left upper lobe pulmonary nodule.    Mr. Gu has a history of stage IIIb colon carcinoma with recurrence of lung cancer to his right lower lobe lung.  He is status post right lower lobectomy in 2017.      Plan is to repeat a CT scan of the chest in 4 months with contrast for reevaluation.    Mr. Gu is asymptomatic and he is in agreement with current plan and recommendation

## 2025-01-17 ENCOUNTER — OFFICE VISIT (OUTPATIENT)
Dept: ENT CLINIC | Age: 60
End: 2025-01-17

## 2025-01-17 VITALS
HEIGHT: 70 IN | BODY MASS INDEX: 25.62 KG/M2 | DIASTOLIC BLOOD PRESSURE: 88 MMHG | WEIGHT: 179 LBS | SYSTOLIC BLOOD PRESSURE: 128 MMHG

## 2025-01-17 DIAGNOSIS — H61.23 BILATERAL IMPACTED CERUMEN: Primary | ICD-10-CM

## 2025-01-17 RX ORDER — ROSUVASTATIN CALCIUM 20 MG/1
20 TABLET, COATED ORAL DAILY
COMMUNITY
Start: 2024-12-09

## 2025-01-17 NOTE — PROGRESS NOTES
Mr. Gu is a pleasant 59-year-old  male that presents for a 6-month cerumen check.  He reports he has noticed gradual diminished hearing with the right greater than left.  He denies any discharge from the ears or any additional problems.      Physical examination with the microscope confirmed bilateral cerumen impactions to be present.  This was successfully removed with alligator graspers and suction with no complications.  After disimpaction the TMs appear to be normal.  Neck exam demonstrated no lymphadenopathy or thyromegaly.      Impression: Successful cerumen disimpaction with microscopy and instrumentation-bilateral    Plan: Patient is to follow-up in 6 months for cerumen check.  He was reminded to call if he has any questions or concerns.      Electronically signed by ELIDIA MCINTYRE PA-C on 1/17/25 at 11:41 AM CST

## 2025-07-18 ENCOUNTER — OFFICE VISIT (OUTPATIENT)
Dept: ENT CLINIC | Age: 60
End: 2025-07-18

## 2025-07-18 VITALS
WEIGHT: 175 LBS | DIASTOLIC BLOOD PRESSURE: 78 MMHG | HEIGHT: 70 IN | BODY MASS INDEX: 25.05 KG/M2 | SYSTOLIC BLOOD PRESSURE: 112 MMHG

## 2025-07-18 DIAGNOSIS — H61.23 BILATERAL IMPACTED CERUMEN: Primary | ICD-10-CM

## 2025-07-18 NOTE — PROGRESS NOTES
Mr. Gu is a very pleasant 60-year-old  male that presents for a 3-month cerumen check.  Patient reports that he has noticed gradual diminished hearing and believes that he has recurrent cerumen impactions.  Denies any drainage from the canals.      Physical examination with the microscope confirm bilateral cerumen impactions to be present.  This was successfully removed with alligator graspers and suction with no complications.  After disimpaction the TMs appeared to be normal.  Neck exam demonstrated no lymphadenopathy or thyromegaly.      Impression: Successful cerumen disimpaction with microscopy and instrumentation-bilateral      Plan: Patient is follow-up in 3 months for cerumen check.  He was reminded to call if he has any questions or concerns.      Electronically signed by ELIDIA MCINTYRE PA-C on 7/18/25 at 12:40 PM CDT

## (undated) DEVICE — PACK,UNIVERSAL,NO GOWNS: Brand: MEDLINE

## (undated) DEVICE — TROCAR: Brand: KII FIOS FIRST ENTRY

## (undated) DEVICE — PRESSURE MONITORING SET: Brand: TRUWAVE

## (undated) DEVICE — Z INACTIVE USE 2641838 CLIP INT L ORNG TI TRNSVRS GRV CHEVRON SHP W/ PRECIS TIP TO

## (undated) DEVICE — CATHETER THORACENTESIS STR 28 FRX23 IN 6 EYELET TAPR TIP LF

## (undated) DEVICE — MEDI-VAC YANK SUCT HNDL W/TPRD BULBOUS TIP: Brand: CARDINAL HEALTH

## (undated) DEVICE — KIT URIN CATHETER 16 FR 5 CC M INDWL SIL

## (undated) DEVICE — SOLUTION IV IRRIG WATER 1000ML POUR BRL 2F7114

## (undated) DEVICE — DRESSING FOAM W4XL12IN SIL RECT ADH WTRPRF FLM BK W/ BORD

## (undated) DEVICE — SET IV LVP AS 10 DROP 4 SMARTSITES 4-WAY STOPCOCK

## (undated) DEVICE — SPONGE SURG WHT KTNR DISECT RADPQ ST

## (undated) DEVICE — SUTURE PERMAHAND SZ 0 L30IN NONABSORBABLE BLK SILK BRAID A306H

## (undated) DEVICE — MINOR CDS: Brand: MEDLINE INDUSTRIES, INC.

## (undated) DEVICE — SUTURE VCRL SZ 4-0 L27IN ABSRB UD L19MM PS-2 3/8 CIR PRIM J426H

## (undated) DEVICE — STAPLER SKIN L320MM 35MM VASC TISS 12 FIRING B FRM PWR

## (undated) DEVICE — SUTURE VCRL 1 L27IN ABSRB VLT TP-1 L65MM 1/2 CIR TAPERPOINT J650G

## (undated) DEVICE — Z INACTIVE USE 2535480 CLIP LIG M BLU TI HRT SHP WIRE HORZ 180 PER BX

## (undated) DEVICE — 3M™ IOBAN™ 2 ANTIMICROBIAL INCISE DRAPE 6650EZ: Brand: IOBAN™ 2

## (undated) DEVICE — DRAIN SURG SGL COLL PT TB FOR ATS BG OASIS

## (undated) DEVICE — SUTURE PERMAHAND SZ 4-0 L12X30IN NONABSORBABLE BLK SILK A303H

## (undated) DEVICE — CLIP INT M L GRN TI TRNSVRS GRV CHEVRON SHP W/ PRECIS TIP

## (undated) DEVICE — 3M™ WARMING BLANKET, LOWER BODY, 10 PER CASE, 42568: Brand: BAIR HUGGER™

## (undated) DEVICE — TOWEL,OR,DSP,ST,BLUE,DLX,4/PK,20PK/CS: Brand: MEDLINE

## (undated) DEVICE — SUTURE VCRL SZ 2-0 L36IN ABSRB UD L36MM CT-1 1/2 CIR J945H

## (undated) DEVICE — SUTURE PERMAHAND SZ 2-0 L30IN NONABSORBABLE BLK SILK W/O A305H

## (undated) DEVICE — STAPLER INT STPL LN H1.8-4.8XL60MM THCK TISS 2 ROW 8 FIRING

## (undated) DEVICE — TIBURON DRAPE TOWELS: Brand: CONVERTORS

## (undated) DEVICE — SUTURE PERMAHAND SZ 3-0 L30IN NONABSORBABLE BLK SILK BRAID A304H

## (undated) DEVICE — VITAL SIGNS™ ADULT FACE MASK WITH ADJUSTABLE AIR CUSHION - SIZE 5: Brand: VITAL SIGNS™

## (undated) DEVICE — SUTURE VCRL SZ 0 L27IN ABSRB VLT L48MM CTX 1/2 CIR TAPR PNT J364H

## (undated) DEVICE — SYSTEM SKIN CLSR 22CM DERMBND PRINEO

## (undated) DEVICE — STAPLER INT L34CM 60MM LNG ENDOSCP ARTC PWR + ECHELON FLX

## (undated) DEVICE — E-Z CLEAN, NON-STICK, PTFE COATED, ELECTROSURGICAL BLADE ELECTRODE, 6.5 INCH (16.5 CM): Brand: MEGADYNE